# Patient Record
Sex: MALE | Race: WHITE | Employment: OTHER | URBAN - METROPOLITAN AREA
[De-identification: names, ages, dates, MRNs, and addresses within clinical notes are randomized per-mention and may not be internally consistent; named-entity substitution may affect disease eponyms.]

---

## 2017-01-06 ENCOUNTER — OFFICE VISIT (OUTPATIENT)
Dept: FAMILY MEDICINE CLINIC | Age: 37
End: 2017-01-06

## 2017-01-06 VITALS
WEIGHT: 315 LBS | HEART RATE: 84 BPM | DIASTOLIC BLOOD PRESSURE: 84 MMHG | SYSTOLIC BLOOD PRESSURE: 136 MMHG | BODY MASS INDEX: 44.25 KG/M2

## 2017-01-06 DIAGNOSIS — E29.1 PRIMARY HYPOGONADISM IN MALE: ICD-10-CM

## 2017-01-06 DIAGNOSIS — R41.3 MEMORY LOSS: ICD-10-CM

## 2017-01-06 DIAGNOSIS — I10 ESSENTIAL HYPERTENSION: Primary | ICD-10-CM

## 2017-01-06 PROCEDURE — 99214 OFFICE O/P EST MOD 30 MIN: CPT | Performed by: FAMILY MEDICINE

## 2017-01-06 RX ORDER — AMLODIPINE BESYLATE 10 MG/1
10 TABLET ORAL DAILY
Qty: 30 TABLET | Refills: 5 | Status: SHIPPED | OUTPATIENT
Start: 2017-01-06 | End: 2017-09-29 | Stop reason: SDUPTHER

## 2017-01-08 ASSESSMENT — ENCOUNTER SYMPTOMS
ABDOMINAL PAIN: 0
BACK PAIN: 1
COUGH: 0
WHEEZING: 0
SHORTNESS OF BREATH: 1

## 2017-01-18 ENCOUNTER — INITIAL CONSULT (OUTPATIENT)
Dept: NEUROLOGY | Age: 37
End: 2017-01-18

## 2017-01-18 VITALS
SYSTOLIC BLOOD PRESSURE: 128 MMHG | BODY MASS INDEX: 39.17 KG/M2 | WEIGHT: 315 LBS | HEIGHT: 75 IN | DIASTOLIC BLOOD PRESSURE: 83 MMHG | OXYGEN SATURATION: 96 % | HEART RATE: 78 BPM

## 2017-01-18 DIAGNOSIS — G47.33 OSA ON CPAP: ICD-10-CM

## 2017-01-18 DIAGNOSIS — R55 SYNCOPE AND COLLAPSE: Primary | ICD-10-CM

## 2017-01-18 DIAGNOSIS — F07.81 CTE (CHRONIC TRAUMATIC ENCEPHALOPATHY): ICD-10-CM

## 2017-01-18 DIAGNOSIS — Z99.89 OSA ON CPAP: ICD-10-CM

## 2017-01-18 DIAGNOSIS — R56.9 NEW ONSET SEIZURE (HCC): ICD-10-CM

## 2017-01-18 DIAGNOSIS — R41.3 MEMORY LOSS: ICD-10-CM

## 2017-01-18 PROCEDURE — 99214 OFFICE O/P EST MOD 30 MIN: CPT | Performed by: PSYCHIATRY & NEUROLOGY

## 2017-01-26 ENCOUNTER — HOSPITAL ENCOUNTER (OUTPATIENT)
Dept: NEUROLOGY | Age: 37
Discharge: HOME | End: 2017-01-26
Attending: PSYCHIATRY & NEUROLOGY | Admitting: PSYCHIATRY & NEUROLOGY

## 2017-01-26 DIAGNOSIS — R55 SYNCOPE AND COLLAPSE: ICD-10-CM

## 2017-01-26 PROCEDURE — 95816 EEG AWAKE AND DROWSY: CPT | Performed by: PSYCHIATRY & NEUROLOGY

## 2017-02-02 ENCOUNTER — TELEPHONE (OUTPATIENT)
Dept: NEUROLOGY | Age: 37
End: 2017-02-02

## 2017-02-07 ENCOUNTER — OFFICE VISIT (OUTPATIENT)
Dept: PULMONOLOGY | Age: 37
End: 2017-02-07

## 2017-02-07 ENCOUNTER — TELEPHONE (OUTPATIENT)
Dept: PULMONOLOGY | Age: 37
End: 2017-02-07

## 2017-02-07 VITALS
TEMPERATURE: 97.1 F | SYSTOLIC BLOOD PRESSURE: 152 MMHG | RESPIRATION RATE: 20 BRPM | BODY MASS INDEX: 39.17 KG/M2 | WEIGHT: 315 LBS | HEIGHT: 75 IN | OXYGEN SATURATION: 98 % | DIASTOLIC BLOOD PRESSURE: 88 MMHG | HEART RATE: 111 BPM

## 2017-02-07 DIAGNOSIS — E66.01 MORBID OBESITY, UNSPECIFIED OBESITY TYPE (HCC): ICD-10-CM

## 2017-02-07 DIAGNOSIS — J98.6 PARALYZED HEMIDIAPHRAGM: ICD-10-CM

## 2017-02-07 DIAGNOSIS — R06.00 DYSPNEA, UNSPECIFIED TYPE: ICD-10-CM

## 2017-02-07 DIAGNOSIS — J98.6 ELEVATED HEMIDIAPHRAGM: ICD-10-CM

## 2017-02-07 DIAGNOSIS — G47.33 SEVERE OBSTRUCTIVE SLEEP APNEA: ICD-10-CM

## 2017-02-07 DIAGNOSIS — R06.00 DYSPNEA, UNSPECIFIED TYPE: Primary | ICD-10-CM

## 2017-02-07 DIAGNOSIS — J98.6 ELEVATED HEMIDIAPHRAGM: Primary | ICD-10-CM

## 2017-02-07 PROCEDURE — 99214 OFFICE O/P EST MOD 30 MIN: CPT | Performed by: INTERNAL MEDICINE

## 2017-02-07 RX ORDER — ALBUTEROL SULFATE 90 UG/1
2 AEROSOL, METERED RESPIRATORY (INHALATION) EVERY 6 HOURS PRN
Qty: 1 INHALER | Refills: 6 | Status: SHIPPED | OUTPATIENT
Start: 2017-02-07 | End: 2018-01-23 | Stop reason: ALTCHOICE

## 2017-02-13 ENCOUNTER — HOSPITAL ENCOUNTER (OUTPATIENT)
Dept: OTHER | Age: 37
Discharge: HOME | End: 2017-02-13
Attending: INTERNAL MEDICINE | Admitting: INTERNAL MEDICINE

## 2017-02-13 VITALS
BODY MASS INDEX: 39.17 KG/M2 | DIASTOLIC BLOOD PRESSURE: 80 MMHG | SYSTOLIC BLOOD PRESSURE: 164 MMHG | HEIGHT: 75 IN | WEIGHT: 315 LBS | TEMPERATURE: 97.8 F | HEART RATE: 81 BPM | RESPIRATION RATE: 18 BRPM | OXYGEN SATURATION: 98 %

## 2017-02-13 DIAGNOSIS — R93.89 ABNORMAL CXR: ICD-10-CM

## 2017-02-13 PROCEDURE — 31624 DX BRONCHOSCOPE/LAVAGE: CPT | Performed by: INTERNAL MEDICINE

## 2017-02-15 LAB
CULTURE, RESPIRATORY: NORMAL
GRAM STAIN RESULT: NORMAL

## 2017-02-17 ENCOUNTER — TELEPHONE (OUTPATIENT)
Dept: BARIATRICS/WEIGHT MGMT | Age: 37
End: 2017-02-17

## 2017-02-17 ENCOUNTER — HOSPITAL ENCOUNTER (OUTPATIENT)
Dept: PULMONOLOGY | Age: 37
Discharge: HOME | End: 2017-02-17
Attending: INTERNAL MEDICINE | Admitting: INTERNAL MEDICINE

## 2017-02-17 ENCOUNTER — HOSPITAL ENCOUNTER (OUTPATIENT)
Dept: PULMONOLOGY | Age: 37
Discharge: HOME | End: 2017-02-13
Attending: INTERNAL MEDICINE | Admitting: INTERNAL MEDICINE

## 2017-02-17 DIAGNOSIS — R06.00 DYSPNEA: ICD-10-CM

## 2017-02-17 RX ORDER — ALBUTEROL SULFATE 2.5 MG/3ML
2.5 SOLUTION RESPIRATORY (INHALATION) ONCE
Status: COMPLETED | OUTPATIENT
Start: 2017-02-17 | End: 2017-02-17

## 2017-02-17 RX ADMIN — ALBUTEROL SULFATE 2.5 MG: 2.5 SOLUTION RESPIRATORY (INHALATION) at 09:56

## 2017-02-27 ENCOUNTER — OFFICE VISIT (OUTPATIENT)
Dept: PULMONOLOGY | Age: 37
End: 2017-02-27

## 2017-02-27 ENCOUNTER — TELEPHONE (OUTPATIENT)
Dept: PULMONOLOGY | Age: 37
End: 2017-02-27

## 2017-02-27 ENCOUNTER — HOSPITAL ENCOUNTER (OUTPATIENT)
Dept: OTHER | Age: 37
Discharge: HOME | End: 2017-02-27
Attending: INTERNAL MEDICINE | Admitting: INTERNAL MEDICINE

## 2017-02-27 VITALS
SYSTOLIC BLOOD PRESSURE: 140 MMHG | HEART RATE: 68 BPM | BODY MASS INDEX: 39.17 KG/M2 | OXYGEN SATURATION: 98 % | RESPIRATION RATE: 20 BRPM | TEMPERATURE: 97.8 F | DIASTOLIC BLOOD PRESSURE: 88 MMHG | WEIGHT: 315 LBS | HEIGHT: 75 IN

## 2017-02-27 DIAGNOSIS — R06.00 DYSPNEA, UNSPECIFIED TYPE: Primary | ICD-10-CM

## 2017-02-27 DIAGNOSIS — G47.33 SEVERE OBSTRUCTIVE SLEEP APNEA: ICD-10-CM

## 2017-02-27 DIAGNOSIS — R93.89 ABNORMAL CT SCAN, CHEST: ICD-10-CM

## 2017-02-27 DIAGNOSIS — E66.01 MORBID OBESITY, UNSPECIFIED OBESITY TYPE (HCC): ICD-10-CM

## 2017-02-27 LAB — D DIMER: <200 NG/ML DDU (ref 0–229)

## 2017-02-27 PROCEDURE — 99214 OFFICE O/P EST MOD 30 MIN: CPT | Performed by: INTERNAL MEDICINE

## 2017-02-27 RX ORDER — BUDESONIDE AND FORMOTEROL FUMARATE DIHYDRATE 160; 4.5 UG/1; UG/1
2 AEROSOL RESPIRATORY (INHALATION) 2 TIMES DAILY
Qty: 1 INHALER | Refills: 6 | Status: SHIPPED | OUTPATIENT
Start: 2017-02-27 | End: 2017-03-02 | Stop reason: CLARIF

## 2017-03-01 ENCOUNTER — TELEPHONE (OUTPATIENT)
Dept: BARIATRICS/WEIGHT MGMT | Age: 37
End: 2017-03-01

## 2017-03-02 ENCOUNTER — TELEPHONE (OUTPATIENT)
Dept: FAMILY MEDICINE CLINIC | Age: 37
End: 2017-03-02

## 2017-03-02 RX ORDER — FLUTICASONE FUROATE AND VILANTEROL 100; 25 UG/1; UG/1
1 POWDER RESPIRATORY (INHALATION) DAILY
Qty: 1 EACH | Refills: 5 | Status: SHIPPED | OUTPATIENT
Start: 2017-03-02 | End: 2017-07-03

## 2017-03-02 NOTE — TELEPHONE ENCOUNTER
Patient was advised we do not do these, we have no chain of custody for this type of test.  Can try an urgent care but ultimately this is between him and the probation office/court.

## 2017-03-18 ENCOUNTER — TELEPHONE (OUTPATIENT)
Dept: FAMILY MEDICINE CLINIC | Age: 37
End: 2017-03-18

## 2017-03-18 RX ORDER — OSELTAMIVIR PHOSPHATE 75 MG/1
75 CAPSULE ORAL DAILY
Qty: 10 CAPSULE | Refills: 0 | Status: SHIPPED | OUTPATIENT
Start: 2017-03-18 | End: 2017-03-28

## 2017-04-03 ENCOUNTER — OFFICE VISIT (OUTPATIENT)
Dept: PULMONOLOGY | Age: 37
End: 2017-04-03

## 2017-04-03 VITALS
OXYGEN SATURATION: 96 % | HEART RATE: 99 BPM | HEIGHT: 75 IN | SYSTOLIC BLOOD PRESSURE: 132 MMHG | DIASTOLIC BLOOD PRESSURE: 80 MMHG | RESPIRATION RATE: 16 BRPM | TEMPERATURE: 97.6 F | WEIGHT: 315 LBS | BODY MASS INDEX: 39.17 KG/M2

## 2017-04-03 DIAGNOSIS — E66.01 MORBID OBESITY, UNSPECIFIED OBESITY TYPE (HCC): ICD-10-CM

## 2017-04-03 DIAGNOSIS — G47.33 SEVERE OBSTRUCTIVE SLEEP APNEA: ICD-10-CM

## 2017-04-03 DIAGNOSIS — R06.00 DYSPNEA, UNSPECIFIED TYPE: Primary | ICD-10-CM

## 2017-04-03 PROCEDURE — 99214 OFFICE O/P EST MOD 30 MIN: CPT | Performed by: INTERNAL MEDICINE

## 2017-04-03 ASSESSMENT — SLEEP AND FATIGUE QUESTIONNAIRES
HOW LIKELY ARE YOU TO NOD OFF OR FALL ASLEEP WHILE WATCHING TV: 0
HOW LIKELY ARE YOU TO NOD OFF OR FALL ASLEEP WHILE SITTING AND READING: 0
HOW LIKELY ARE YOU TO NOD OFF OR FALL ASLEEP WHILE SITTING AND TALKING TO SOMEONE: 0
HOW LIKELY ARE YOU TO NOD OFF OR FALL ASLEEP WHILE SITTING INACTIVE IN A PUBLIC PLACE: 0
NECK CIRCUMFERENCE (INCHES): 20
HOW LIKELY ARE YOU TO NOD OFF OR FALL ASLEEP IN A CAR, WHILE STOPPED FOR A FEW MINUTES IN TRAFFIC: 0
ESS TOTAL SCORE: 0
HOW LIKELY ARE YOU TO NOD OFF OR FALL ASLEEP WHILE LYING DOWN TO REST IN THE AFTERNOON WHEN CIRCUMSTANCES PERMIT: 0
HOW LIKELY ARE YOU TO NOD OFF OR FALL ASLEEP WHILE SITTING QUIETLY AFTER LUNCH WITHOUT ALCOHOL: 0
HOW LIKELY ARE YOU TO NOD OFF OR FALL ASLEEP WHEN YOU ARE A PASSENGER IN A CAR FOR AN HOUR WITHOUT A BREAK: 0

## 2017-04-24 ENCOUNTER — HOSPITAL ENCOUNTER (OUTPATIENT)
Dept: SLEEP MEDICINE | Age: 37
Discharge: HOME | End: 2017-04-26
Attending: INTERNAL MEDICINE | Admitting: INTERNAL MEDICINE

## 2017-04-24 DIAGNOSIS — G47.33 SEVERE OBSTRUCTIVE SLEEP APNEA: ICD-10-CM

## 2017-04-27 DIAGNOSIS — G47.33 OSA ON CPAP: ICD-10-CM

## 2017-04-27 DIAGNOSIS — G47.33 OSA (OBSTRUCTIVE SLEEP APNEA): Primary | ICD-10-CM

## 2017-04-27 DIAGNOSIS — E66.01 OBESITY, CLASS III, BMI 40-49.9 (MORBID OBESITY) (HCC): ICD-10-CM

## 2017-04-27 DIAGNOSIS — Z99.89 OSA ON CPAP: ICD-10-CM

## 2017-06-05 ENCOUNTER — OFFICE VISIT (OUTPATIENT)
Dept: CARDIOLOGY CLINIC | Age: 37
End: 2017-06-05

## 2017-06-05 VITALS
BODY MASS INDEX: 39.17 KG/M2 | HEIGHT: 75 IN | HEART RATE: 104 BPM | DIASTOLIC BLOOD PRESSURE: 90 MMHG | OXYGEN SATURATION: 97 % | WEIGHT: 315 LBS | SYSTOLIC BLOOD PRESSURE: 142 MMHG

## 2017-06-05 DIAGNOSIS — E66.9 OBESITY, UNSPECIFIED OBESITY SEVERITY, UNSPECIFIED OBESITY TYPE: ICD-10-CM

## 2017-06-05 DIAGNOSIS — R06.02 SOB (SHORTNESS OF BREATH): Primary | ICD-10-CM

## 2017-06-05 DIAGNOSIS — I10 ESSENTIAL HYPERTENSION: ICD-10-CM

## 2017-06-05 PROCEDURE — 99215 OFFICE O/P EST HI 40 MIN: CPT | Performed by: INTERNAL MEDICINE

## 2017-06-05 PROCEDURE — 93000 ELECTROCARDIOGRAM COMPLETE: CPT | Performed by: INTERNAL MEDICINE

## 2017-07-03 ENCOUNTER — OFFICE VISIT (OUTPATIENT)
Dept: PULMONOLOGY | Age: 37
End: 2017-07-03

## 2017-07-03 VITALS
OXYGEN SATURATION: 95 % | BODY MASS INDEX: 39.17 KG/M2 | HEIGHT: 75 IN | WEIGHT: 315 LBS | HEART RATE: 100 BPM | RESPIRATION RATE: 20 BRPM | SYSTOLIC BLOOD PRESSURE: 144 MMHG | TEMPERATURE: 97.9 F | DIASTOLIC BLOOD PRESSURE: 98 MMHG

## 2017-07-03 DIAGNOSIS — R06.00 DYSPNEA, UNSPECIFIED TYPE: ICD-10-CM

## 2017-07-03 DIAGNOSIS — G47.33 SEVERE OBSTRUCTIVE SLEEP APNEA: Primary | ICD-10-CM

## 2017-07-03 PROCEDURE — 99214 OFFICE O/P EST MOD 30 MIN: CPT | Performed by: INTERNAL MEDICINE

## 2017-07-03 ASSESSMENT — SLEEP AND FATIGUE QUESTIONNAIRES
HOW LIKELY ARE YOU TO NOD OFF OR FALL ASLEEP WHEN YOU ARE A PASSENGER IN A CAR FOR AN HOUR WITHOUT A BREAK: 0
HOW LIKELY ARE YOU TO NOD OFF OR FALL ASLEEP WHILE LYING DOWN TO REST IN THE AFTERNOON WHEN CIRCUMSTANCES PERMIT: 0
ESS TOTAL SCORE: 0
NECK CIRCUMFERENCE (INCHES): 20
HOW LIKELY ARE YOU TO NOD OFF OR FALL ASLEEP WHILE SITTING INACTIVE IN A PUBLIC PLACE: 0
HOW LIKELY ARE YOU TO NOD OFF OR FALL ASLEEP WHILE SITTING QUIETLY AFTER LUNCH WITHOUT ALCOHOL: 0
HOW LIKELY ARE YOU TO NOD OFF OR FALL ASLEEP WHILE SITTING AND TALKING TO SOMEONE: 0
HOW LIKELY ARE YOU TO NOD OFF OR FALL ASLEEP IN A CAR, WHILE STOPPED FOR A FEW MINUTES IN TRAFFIC: 0
HOW LIKELY ARE YOU TO NOD OFF OR FALL ASLEEP WHILE WATCHING TV: 0
HOW LIKELY ARE YOU TO NOD OFF OR FALL ASLEEP WHILE SITTING AND READING: 0

## 2017-07-18 ENCOUNTER — HOSPITAL ENCOUNTER (OUTPATIENT)
Dept: NON INVASIVE DIAGNOSTICS | Age: 37
Discharge: HOME | End: 2017-07-18
Attending: INTERNAL MEDICINE | Admitting: INTERNAL MEDICINE

## 2017-07-18 DIAGNOSIS — I10 ESSENTIAL (PRIMARY) HYPERTENSION: ICD-10-CM

## 2017-07-18 LAB
LV EF: 55 %
LVEF MODALITY: NORMAL
PRO-BNP: 7 PG/ML (ref 0–124)

## 2017-07-20 ENCOUNTER — TELEPHONE (OUTPATIENT)
Dept: CARDIOLOGY CLINIC | Age: 37
End: 2017-07-20

## 2017-07-24 ENCOUNTER — TELEPHONE (OUTPATIENT)
Dept: CARDIOLOGY CLINIC | Age: 37
End: 2017-07-24

## 2017-07-24 DIAGNOSIS — R06.02 SOB (SHORTNESS OF BREATH): Primary | ICD-10-CM

## 2017-07-27 ENCOUNTER — TELEPHONE (OUTPATIENT)
Dept: CARDIOLOGY CLINIC | Age: 37
End: 2017-07-27

## 2017-08-11 ENCOUNTER — TELEPHONE (OUTPATIENT)
Dept: FAMILY MEDICINE CLINIC | Age: 37
End: 2017-08-11

## 2017-08-11 ENCOUNTER — TELEPHONE (OUTPATIENT)
Dept: CARDIOLOGY CLINIC | Age: 37
End: 2017-08-11

## 2017-09-11 ENCOUNTER — OFFICE VISIT (OUTPATIENT)
Dept: CARDIOLOGY CLINIC | Age: 37
End: 2017-09-11

## 2017-09-11 VITALS
BODY MASS INDEX: 39.17 KG/M2 | WEIGHT: 315 LBS | SYSTOLIC BLOOD PRESSURE: 134 MMHG | HEIGHT: 75 IN | HEART RATE: 78 BPM | DIASTOLIC BLOOD PRESSURE: 88 MMHG

## 2017-09-11 DIAGNOSIS — I27.20 PULMONARY HTN (HCC): ICD-10-CM

## 2017-09-11 DIAGNOSIS — E66.01 OBESITY, CLASS III, BMI 40-49.9 (MORBID OBESITY) (HCC): Primary | ICD-10-CM

## 2017-09-11 DIAGNOSIS — I51.7 MILD CONCENTRIC LEFT VENTRICULAR HYPERTROPHY (LVH): ICD-10-CM

## 2017-09-11 PROCEDURE — 99214 OFFICE O/P EST MOD 30 MIN: CPT | Performed by: INTERNAL MEDICINE

## 2017-09-29 DIAGNOSIS — I10 ESSENTIAL HYPERTENSION: ICD-10-CM

## 2017-09-29 RX ORDER — AMLODIPINE BESYLATE 10 MG/1
10 TABLET ORAL DAILY
Qty: 30 TABLET | Refills: 2 | Status: SHIPPED | OUTPATIENT
Start: 2017-09-29 | End: 2018-01-08 | Stop reason: SDUPTHER

## 2017-09-29 NOTE — TELEPHONE ENCOUNTER
Done/noted. Nurse - Please advise patient he's due for followup fasting office visit within the next 3 months.

## 2017-10-03 ENCOUNTER — OFFICE VISIT (OUTPATIENT)
Dept: NEUROLOGY | Age: 37
End: 2017-10-03

## 2017-10-03 VITALS
OXYGEN SATURATION: 96 % | HEIGHT: 75 IN | SYSTOLIC BLOOD PRESSURE: 146 MMHG | WEIGHT: 315 LBS | DIASTOLIC BLOOD PRESSURE: 101 MMHG | HEART RATE: 92 BPM | BODY MASS INDEX: 39.17 KG/M2

## 2017-10-03 DIAGNOSIS — G43.119 INTRACTABLE MIGRAINE WITH AURA WITHOUT STATUS MIGRAINOSUS: ICD-10-CM

## 2017-10-03 DIAGNOSIS — Z99.89 OSA ON CPAP: ICD-10-CM

## 2017-10-03 DIAGNOSIS — G44.52 NEW PERSISTENT DAILY HEADACHE: Primary | ICD-10-CM

## 2017-10-03 DIAGNOSIS — F07.81 CTE (CHRONIC TRAUMATIC ENCEPHALOPATHY): ICD-10-CM

## 2017-10-03 DIAGNOSIS — G47.33 OSA ON CPAP: ICD-10-CM

## 2017-10-03 PROCEDURE — 99214 OFFICE O/P EST MOD 30 MIN: CPT | Performed by: PSYCHIATRY & NEUROLOGY

## 2017-10-03 RX ORDER — TOPIRAMATE 25 MG/1
50 TABLET ORAL 2 TIMES DAILY
Qty: 120 TABLET | Refills: 3 | Status: SHIPPED | OUTPATIENT
Start: 2017-10-03 | End: 2018-02-20 | Stop reason: SDUPTHER

## 2017-10-03 ASSESSMENT — ENCOUNTER SYMPTOMS
BACK PAIN: 0
VOMITING: 0
PHOTOPHOBIA: 1
ABDOMINAL PAIN: 0
DOUBLE VISION: 0
NAUSEA: 1
COUGH: 0
HEMOPTYSIS: 0

## 2017-10-03 NOTE — PROGRESS NOTES
The patient came today for follow up regarding: memory loss and chronic headaches. The patient was last seen in 1-17. Since that time, he had MRI brain which showed no acute lesions. He denies any more LOC or TROY. He has occasional and episodic short-term memory loss three times a week. He can be forgetful at times and needs prompt from his wife. He continues to be independent in his ADL. He is using his CPAP machine every night. Denies any excessive EDS or frequent arousals at night. Patient is complaining of frequent headaches. Frequency could be few times a week. Location is in the right frontal and temporal and description can be dull achy and pulsating. Triggers: Including noise, stress or light. He has some nausea but no vomiting. Occasional blurred vision. No visual aura. Duration is minutes to hours and degree can be severe. He has been using Excedrin Migraine daily for quite some time. No recent trauma or injury or fever or chills. Other review of system was unremarkable. Past Medical History:   Diagnosis Date    Chicken pox 3/84    Closed fracture of arm age 13    Concussion     Hx of concussions    Concussion     Decorative tattoo     Fall     GERD (gastroesophageal reflux disease)     Hormone replacement therapy     Hypertension     Obese     XAVIER on CPAP     Pericarditis     Pneumonia     Prolonged emergence from general anesthesia     agitation    Umbilical hernia 75/22/3668     Prior to Visit Medications    Medication Sig Taking?  Authorizing Provider   amLODIPine (NORVASC) 10 MG tablet Take 1 tablet by mouth daily (instead of the 5mg dose.) Yes Js Hoskins MD   albuterol sulfate HFA (VENTOLIN HFA) 108 (90 BASE) MCG/ACT inhaler Inhale 2 puffs into the lungs every 6 hours as needed for Wheezing or Shortness of Breath Yes Everardo Rivas MD   TESTOSTERONE IM Inject into the muscle Yes Historical Provider, MD   NONFORMULARY secretropin spray Yes Historical

## 2017-10-03 NOTE — PATIENT INSTRUCTIONS
TOPAMAX 25 mg tablet    Week one 0 tabs a.m. 1 tab p.m. Week two 1 tab a.m. 1 tab p.m. Week three 1 tab a.m. 2 tabs p.m. Week four 2 tabs a.m. 2 tabs p.m.

## 2017-10-03 NOTE — MR AVS SNAPSHOT
cancers. BMI is not perfect. It may overestimate body fat in athletes and people who are more muscular. Even a small weight loss (between 5 and 10 percent of your current weight) by decreasing your calorie intake and becoming more physically active will help lower your risk of developing or worsening diseases associated with obesity. Learn more at: [employer]          Instructions    TOPAMAX 25 mg tablet    Week one 0 tabs a.m. 1 tab p.m. Week two 1 tab a.m. 1 tab p.m. Week three 1 tab a.m. 2 tabs p.m. Week four 2 tabs a.m. 2 tabs p.m. Today's Medication Changes          These changes are accurate as of: 10/3/17 12:57 PM.  If you have any questions, ask your nurse or doctor. START taking these medications           topiramate 25 MG tablet   Commonly known as:  TOPAMAX   Instructions: Take 2 tablets by mouth 2 times daily   Quantity:  120 tablet   Refills:  3   Started by:   Jayleen Casillas MD            Where to Get Your Medications      These medications were sent to [address] [place] [address],[facility], [address] [place] [address], [address] [id number]     Phone:  [phone]     topiramate 25 MG tablet               Your Current Medications Are              topiramate (TOPAMAX) 25 MG tablet Take 2 tablets by mouth 2 times daily    amLODIPine (NORVASC) 10 MG tablet Take 1 tablet by mouth daily (instead of the 5mg dose.)    albuterol sulfate HFA (VENTOLIN HFA) 108 (90 BASE) MCG/ACT inhaler Inhale 2 puffs into the lungs every 6 hours as needed for Wheezing or Shortness of Breath    TESTOSTERONE IM Inject into the muscle    NONFORMULARY secretropin spray    NONFORMULARY Cognitive aminos    ketoprofen (ORUDIS) 50 MG capsule Take 1 capsule by mouth 3 times daily as needed for Pain    DHEA 10 MG TABS Take 10 mg/day by mouth    Pregnenolone POWD 30 capsules by Does not apply route Daily

## 2017-10-11 ENCOUNTER — OFFICE VISIT (OUTPATIENT)
Dept: PULMONOLOGY | Age: 37
End: 2017-10-11

## 2017-10-11 VITALS
DIASTOLIC BLOOD PRESSURE: 92 MMHG | BODY MASS INDEX: 39.17 KG/M2 | HEART RATE: 84 BPM | TEMPERATURE: 97.9 F | RESPIRATION RATE: 16 BRPM | HEIGHT: 75 IN | OXYGEN SATURATION: 98 % | WEIGHT: 315 LBS | SYSTOLIC BLOOD PRESSURE: 156 MMHG

## 2017-10-11 DIAGNOSIS — R06.00 DYSPNEA, UNSPECIFIED TYPE: ICD-10-CM

## 2017-10-11 DIAGNOSIS — J98.6 DIAPHRAGM PARALYSIS: ICD-10-CM

## 2017-10-11 DIAGNOSIS — G47.33 SEVERE OBSTRUCTIVE SLEEP APNEA: Primary | ICD-10-CM

## 2017-10-11 PROCEDURE — 99214 OFFICE O/P EST MOD 30 MIN: CPT | Performed by: INTERNAL MEDICINE

## 2017-10-11 RX ORDER — BUDESONIDE AND FORMOTEROL FUMARATE DIHYDRATE 160; 4.5 UG/1; UG/1
2 AEROSOL RESPIRATORY (INHALATION) 2 TIMES DAILY
Qty: 1 INHALER | Refills: 0 | COMMUNITY
Start: 2017-10-11 | End: 2018-01-23 | Stop reason: ALTCHOICE

## 2017-10-11 ASSESSMENT — SLEEP AND FATIGUE QUESTIONNAIRES
HOW LIKELY ARE YOU TO NOD OFF OR FALL ASLEEP WHILE LYING DOWN TO REST IN THE AFTERNOON WHEN CIRCUMSTANCES PERMIT: 0
HOW LIKELY ARE YOU TO NOD OFF OR FALL ASLEEP WHILE WATCHING TV: 0
NECK CIRCUMFERENCE (INCHES): 21.25
ESS TOTAL SCORE: 0
HOW LIKELY ARE YOU TO NOD OFF OR FALL ASLEEP WHILE SITTING QUIETLY AFTER LUNCH WITHOUT ALCOHOL: 0
HOW LIKELY ARE YOU TO NOD OFF OR FALL ASLEEP WHILE SITTING AND TALKING TO SOMEONE: 0
HOW LIKELY ARE YOU TO NOD OFF OR FALL ASLEEP WHILE SITTING INACTIVE IN A PUBLIC PLACE: 0
HOW LIKELY ARE YOU TO NOD OFF OR FALL ASLEEP WHILE SITTING AND READING: 0
HOW LIKELY ARE YOU TO NOD OFF OR FALL ASLEEP WHEN YOU ARE A PASSENGER IN A CAR FOR AN HOUR WITHOUT A BREAK: 0
HOW LIKELY ARE YOU TO NOD OFF OR FALL ASLEEP IN A CAR, WHILE STOPPED FOR A FEW MINUTES IN TRAFFIC: 0

## 2017-10-11 NOTE — PATIENT INSTRUCTIONS
Sleep Hygiene. .. Tips for better sleep. .. Avoid naps. This will ensure you are sleepy at bedtime. If you have to take a nap, sleep less than 1 hour, before 3 pm.  Sleep only when sleepy; this reduces the time you are awake in bed. Regular exercise is recommended to help you deepen your sleep, but not within 4-6 hours of your bedtime. Timing of exercise is important, aim to exercise early in the morning or early afternoon. A light snack may help you fall asleep. Warm milk and foods high in the amino acid tryptophan, such as bananas, may help you to sleep  Be sure to avoid heavy, spicy or sugary foods 4-6 hours before bedtime and avoid at snack time. Stay away from stimulants such as caffeine and nicotine for at least 4-6 hours before bed. Stimulants can interfere with your ability to fall asleep. Caffeine is found in tea, cola, coffee, cocoa and chocolate and is best avoided at bedtime. Nicotine is found in tobacco products. Avoid alcohol 4-6 hours before bedtime. Alcohol has an immediate sleep-inducing effect, after a few hours when alcohol levels fall there is a stimulant or wake-up effect and will cause fragmented sleep. Sleep rituals are important. Give your body clues it is time to slow down and sleep. Examples include; yoga, deep breathing, listen to relaxing music, a hot bath or a few minutes of reading. Have a fixed bedtime and awakening time, Even on weekends! You will feel better keeping a regular sleep cycle, even if you are retired or not working. Get into your favorite sleep position. If not asleep in 30 minutes, get up and do something boring until you feel sleepy. Remember not to expose yourself to bright lights such as TV, phone or tablet screens. Only use your bed for sleeping. Do not use your bed as an office, workroom or recreation room. Use comfortable bedding. Uncomfortable bedding can prevent good sleep. Ensure your bedroom is quiet and comfortable.  A cooler room along with

## 2017-10-11 NOTE — PROGRESS NOTES
Pt can not complete CPET due to Weight restictions. Pt must weigh less then 350lbs.  Dr. Marybeth Silva notified
identified  No acute cardiopulmonary disease. Assessment:       · Severe XAVIER. BiPAP 18/13 cmH2O. Full face mask. · Dyspnea since 2014. Unclear etiology. Normal TSH and Hb. Negative D dimer. Normal MIP and MEP. R hemidiaphragm, reactive airway disease, obesity, pulm HTN, deconditioning and anxiety are contributing. Not able to CPET due to weight. 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 Negative bronch for EBL 2/13/2017   · Abnormal CT chest 2/22/2017 with RLL patchy ASD- likely due to BAL done on Bronch. Resolved on repeat CT chest    · Morbid obesity         Plan:       · Albuterol 2 puffs Q4-6 hrs PRN  · Trial of Symbicort 160/4.5 2 puffs BID   · PFTs and 6MW   · CPET   · Advised to use BiPAP 6-8 hrs at night and during naps. · Replacement of mask, tubing, head straps every 3-6 months or sooner if damaged. · Follow up BIPAP compliance and pressure adjustment if needed  · Sleep hygiene  · Avoid sedatives, alcohol and caffeinated drinks at bed time. · No driving motorized vehicles or operating heavy machinery while fatigue, drowsy or sleepy. · Weight loss is also recommended as a long-term intervention. · Complications of XAVIER if not treated were discussed with patient patient to include systemic hypertension, pulmonary hypertension, cardiovascular morbidities, car accidents and all cause mortality.

## 2018-01-08 DIAGNOSIS — I10 ESSENTIAL HYPERTENSION: ICD-10-CM

## 2018-01-09 RX ORDER — AMLODIPINE BESYLATE 10 MG/1
TABLET ORAL
Qty: 30 TABLET | Refills: 0 | Status: SHIPPED | OUTPATIENT
Start: 2018-01-09 | End: 2018-01-23 | Stop reason: SDUPTHER

## 2018-01-18 ENCOUNTER — TELEPHONE (OUTPATIENT)
Dept: PULMONOLOGY | Age: 38
End: 2018-01-18

## 2018-01-18 NOTE — TELEPHONE ENCOUNTER
Patient did not show for 3 month fua with  on 1/18/18      LOV   Assessment: 10/11/17      · Severe XAVIER. BiPAP 18/13 cmH2O. Full face mask. · Dyspnea since 2014. Unclear etiology. Normal TSH and Hb. Negative D dimer. Normal MIP and MEP. R hemidiaphragm, reactive airway disease, obesity, pulm HTN, deconditioning and anxiety are contributing. Not able to CPET due to weight. 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 Negative bronch for EBL 2/13/2017   · Abnormal CT chest 2/22/2017 with RLL patchy ASD- likely due to BAL done on Bronch. Resolved on repeat CT chest    · Morbid obesity          Plan:       · Albuterol 2 puffs Q4-6 hrs PRN  · Trial of Symbicort 160/4.5 2 puffs BID   · PFTs and 6MW   · CPET   · Advised to use BiPAP 6-8 hrs at night and during naps. · Replacement of mask, tubing, head straps every 3-6 months or sooner if damaged. · Follow up BIPAP compliance and pressure adjustment if needed  · Sleep hygiene  · Avoid sedatives, alcohol and caffeinated drinks at bed time. · No driving motorized vehicles or operating heavy machinery while fatigue, drowsy or sleepy. · Weight loss is also recommended as a long-term intervention.     · Complications of XAVIER if not treated were discussed with patient patient to include systemic hypertension, pulmonary hypertension, cardiovascular morbidities, car accidents and all cause mortality.

## 2018-01-23 ENCOUNTER — OFFICE VISIT (OUTPATIENT)
Dept: FAMILY MEDICINE CLINIC | Age: 38
End: 2018-01-23

## 2018-01-23 VITALS
WEIGHT: 315 LBS | SYSTOLIC BLOOD PRESSURE: 126 MMHG | HEART RATE: 76 BPM | BODY MASS INDEX: 45.5 KG/M2 | DIASTOLIC BLOOD PRESSURE: 80 MMHG

## 2018-01-23 DIAGNOSIS — Z99.89 OSA ON CPAP: ICD-10-CM

## 2018-01-23 DIAGNOSIS — I10 ESSENTIAL HYPERTENSION: ICD-10-CM

## 2018-01-23 DIAGNOSIS — G47.33 OSA ON CPAP: ICD-10-CM

## 2018-01-23 DIAGNOSIS — F07.81 CTE (CHRONIC TRAUMATIC ENCEPHALOPATHY): ICD-10-CM

## 2018-01-23 DIAGNOSIS — R41.3 MEMORY LOSS: Primary | ICD-10-CM

## 2018-01-23 PROCEDURE — 99213 OFFICE O/P EST LOW 20 MIN: CPT | Performed by: FAMILY MEDICINE

## 2018-01-23 PROCEDURE — 1036F TOBACCO NON-USER: CPT | Performed by: FAMILY MEDICINE

## 2018-01-23 PROCEDURE — G8417 CALC BMI ABV UP PARAM F/U: HCPCS | Performed by: FAMILY MEDICINE

## 2018-01-23 PROCEDURE — G8484 FLU IMMUNIZE NO ADMIN: HCPCS | Performed by: FAMILY MEDICINE

## 2018-01-23 PROCEDURE — G8427 DOCREV CUR MEDS BY ELIG CLIN: HCPCS | Performed by: FAMILY MEDICINE

## 2018-01-23 RX ORDER — AMLODIPINE BESYLATE 10 MG/1
TABLET ORAL
Qty: 90 TABLET | Refills: 1 | Status: SHIPPED | OUTPATIENT
Start: 2018-01-23 | End: 2018-01-23 | Stop reason: SDUPTHER

## 2018-01-23 RX ORDER — AMLODIPINE BESYLATE 10 MG/1
TABLET ORAL
Qty: 90 TABLET | Refills: 3 | Status: SHIPPED | OUTPATIENT
Start: 2018-01-23 | End: 2018-09-10 | Stop reason: SDUPTHER

## 2018-01-24 ASSESSMENT — ENCOUNTER SYMPTOMS
SHORTNESS OF BREATH: 0
ABDOMINAL PAIN: 0

## 2018-02-20 ENCOUNTER — OFFICE VISIT (OUTPATIENT)
Dept: NEUROLOGY | Age: 38
End: 2018-02-20

## 2018-02-20 VITALS
OXYGEN SATURATION: 97 % | SYSTOLIC BLOOD PRESSURE: 128 MMHG | DIASTOLIC BLOOD PRESSURE: 81 MMHG | HEIGHT: 75 IN | BODY MASS INDEX: 39.17 KG/M2 | HEART RATE: 67 BPM | WEIGHT: 315 LBS

## 2018-02-20 DIAGNOSIS — G47.33 OSA ON CPAP: ICD-10-CM

## 2018-02-20 DIAGNOSIS — G43.119 INTRACTABLE MIGRAINE WITH AURA WITHOUT STATUS MIGRAINOSUS: Primary | ICD-10-CM

## 2018-02-20 DIAGNOSIS — Z99.89 OSA ON CPAP: ICD-10-CM

## 2018-02-20 DIAGNOSIS — F07.81 CTE (CHRONIC TRAUMATIC ENCEPHALOPATHY): ICD-10-CM

## 2018-02-20 DIAGNOSIS — G44.52 NEW PERSISTENT DAILY HEADACHE: ICD-10-CM

## 2018-02-20 DIAGNOSIS — R41.3 MEMORY LOSS: ICD-10-CM

## 2018-02-20 PROCEDURE — G8417 CALC BMI ABV UP PARAM F/U: HCPCS | Performed by: PSYCHIATRY & NEUROLOGY

## 2018-02-20 PROCEDURE — 99214 OFFICE O/P EST MOD 30 MIN: CPT | Performed by: PSYCHIATRY & NEUROLOGY

## 2018-02-20 PROCEDURE — 1036F TOBACCO NON-USER: CPT | Performed by: PSYCHIATRY & NEUROLOGY

## 2018-02-20 PROCEDURE — G8427 DOCREV CUR MEDS BY ELIG CLIN: HCPCS | Performed by: PSYCHIATRY & NEUROLOGY

## 2018-02-20 PROCEDURE — G8484 FLU IMMUNIZE NO ADMIN: HCPCS | Performed by: PSYCHIATRY & NEUROLOGY

## 2018-02-20 RX ORDER — TOPIRAMATE 25 MG/1
50 TABLET ORAL 2 TIMES DAILY
Qty: 120 TABLET | Refills: 6 | Status: SHIPPED | OUTPATIENT
Start: 2018-02-20 | End: 2019-01-27 | Stop reason: ALTCHOICE

## 2018-02-20 ASSESSMENT — ENCOUNTER SYMPTOMS
GASTROINTESTINAL NEGATIVE: 1
HEARTBURN: 0
RESPIRATORY NEGATIVE: 1
BLURRED VISION: 1

## 2018-02-20 NOTE — PROGRESS NOTES
TABLET BY MOUTH DAILY Yes Js Hoskins MD   topiramate (TOPAMAX) 25 MG tablet Take 2 tablets by mouth 2 times daily Yes Marcelino Andres MD   Multiple Vitamin (MULTIVITAMIN PO) Take  by mouth. Yes Historical Provider, MD     Allergies   Allergen Reactions    Prednisone Hives     itchy    Rocephin [Ceftriaxone] Itching     Social History   Substance Use Topics    Smoking status: Former Smoker     Types: Cigars     Quit date: 5/2/2016    Smokeless tobacco: Never Used    Alcohol use 0.0 oz/week      Comment: socially-rarely     Family History   Problem Relation Age of Onset    Other Father      kidney stones    Cancer Father      bladder cancer, ? second hand smoke    Other Mother      bipolar disorder    Bipolar Disorder Mother     Depression Paternal Grandmother     Bipolar Disorder Other     Heart Disease Maternal Aunt     Cancer Maternal Grandfather      Colon     Past Surgical History:   Procedure Laterality Date    BRONCHOSCOPY  02/13/2016    CHOLECYSTECTOMY  2007 (?)    HERNIA REPAIR  10/14/2016               Umbilical Hernia Repair    HERNIA REPAIR  71/31/7317    umbilical    TONSILLECTOMY AND ADENOIDECTOMY      TYMPANOSTOMY TUBE PLACEMENT      bilaterally x 1    WISDOM TOOTH EXTRACTION      all 4         Exam:   Constitutional:   Vitals:    02/20/18 0953   BP: 128/81   Pulse: 67   SpO2: 97%   Weight: (!) 365 lb (165.6 kg)   Height: 6' 3\" (1.905 m)       General appearance: well-nourished. Eye: No icterus. No blurring of optic disc. Neck: supple  Cardiovascular: No carotid bruit. Heart: S1, S2         No lower leg edema with good pulsation. Mental Status: Oriented to person, place, problem, and time. Fluent speech. Good fund of knowledge. Normal attention span and concentration. Cranial Nerves:   II: Visual fields: Full to confrontation  III: Pupils: equal, round, reactive to light  III,IV,VI: Extra Ocular Movements are intact.  No nystagmus  V: Facial sensation is

## 2018-02-23 ENCOUNTER — OFFICE VISIT (OUTPATIENT)
Dept: FAMILY MEDICINE CLINIC | Age: 38
End: 2018-02-23

## 2018-02-23 VITALS
BODY MASS INDEX: 45.37 KG/M2 | SYSTOLIC BLOOD PRESSURE: 150 MMHG | WEIGHT: 315 LBS | HEART RATE: 84 BPM | DIASTOLIC BLOOD PRESSURE: 80 MMHG

## 2018-02-23 DIAGNOSIS — R53.83 FATIGUE, UNSPECIFIED TYPE: ICD-10-CM

## 2018-02-23 DIAGNOSIS — R51.9 FREQUENT HEADACHES: ICD-10-CM

## 2018-02-23 DIAGNOSIS — Z30.09 VASECTOMY EVALUATION: ICD-10-CM

## 2018-02-23 DIAGNOSIS — R79.89 LOW TESTOSTERONE IN MALE: Primary | ICD-10-CM

## 2018-02-23 DIAGNOSIS — R41.3 MEMORY LOSS: ICD-10-CM

## 2018-02-23 PROCEDURE — G8427 DOCREV CUR MEDS BY ELIG CLIN: HCPCS | Performed by: FAMILY MEDICINE

## 2018-02-23 PROCEDURE — G8484 FLU IMMUNIZE NO ADMIN: HCPCS | Performed by: FAMILY MEDICINE

## 2018-02-23 PROCEDURE — G8417 CALC BMI ABV UP PARAM F/U: HCPCS | Performed by: FAMILY MEDICINE

## 2018-02-23 PROCEDURE — 99213 OFFICE O/P EST LOW 20 MIN: CPT | Performed by: FAMILY MEDICINE

## 2018-02-23 PROCEDURE — 1036F TOBACCO NON-USER: CPT | Performed by: FAMILY MEDICINE

## 2018-02-25 ASSESSMENT — ENCOUNTER SYMPTOMS
COUGH: 0
ABDOMINAL PAIN: 0

## 2018-03-01 ENCOUNTER — OFFICE VISIT (OUTPATIENT)
Dept: ENDOCRINOLOGY | Age: 38
End: 2018-03-01

## 2018-03-01 VITALS
HEART RATE: 74 BPM | DIASTOLIC BLOOD PRESSURE: 72 MMHG | BODY MASS INDEX: 39.17 KG/M2 | HEIGHT: 75 IN | SYSTOLIC BLOOD PRESSURE: 120 MMHG | OXYGEN SATURATION: 99 % | WEIGHT: 315 LBS

## 2018-03-01 DIAGNOSIS — R53.83 OTHER FATIGUE: ICD-10-CM

## 2018-03-01 DIAGNOSIS — E29.1 HYPOGONADISM MALE: Primary | ICD-10-CM

## 2018-03-01 PROCEDURE — G8427 DOCREV CUR MEDS BY ELIG CLIN: HCPCS | Performed by: INTERNAL MEDICINE

## 2018-03-01 PROCEDURE — G8417 CALC BMI ABV UP PARAM F/U: HCPCS | Performed by: INTERNAL MEDICINE

## 2018-03-01 PROCEDURE — G8484 FLU IMMUNIZE NO ADMIN: HCPCS | Performed by: INTERNAL MEDICINE

## 2018-03-01 PROCEDURE — 99244 OFF/OP CNSLTJ NEW/EST MOD 40: CPT | Performed by: INTERNAL MEDICINE

## 2018-03-01 ASSESSMENT — PATIENT HEALTH QUESTIONNAIRE - PHQ9
SUM OF ALL RESPONSES TO PHQ QUESTIONS 1-9: 0
2. FEELING DOWN, DEPRESSED OR HOPELESS: 0
SUM OF ALL RESPONSES TO PHQ9 QUESTIONS 1 & 2: 0
1. LITTLE INTEREST OR PLEASURE IN DOING THINGS: 0

## 2018-03-01 NOTE — PATIENT INSTRUCTIONS
Patient Education        Fatigue: Care Instructions  Your Care Instructions    Fatigue is a feeling of tiredness, exhaustion, or lack of energy. You may feel fatigue because of too much or not enough activity. It can also come from stress, lack of sleep, boredom, and poor diet. Many medical problems, such as viral infections, can cause fatigue. Emotional problems, especially depression, are often the cause of fatigue. Fatigue is most often a symptom of another problem. Treatment for fatigue depends on the cause. For example, if you have fatigue because you have a certain health problem, treating this problem also treats your fatigue. If depression or anxiety is the cause, treatment may help. Follow-up care is a key part of your treatment and safety. Be sure to make and go to all appointments, and call your doctor if you are having problems. It's also a good idea to know your test results and keep a list of the medicines you take. How can you care for yourself at home? · Get regular exercise. But don't overdo it. Go back and forth between rest and exercise. · Get plenty of rest.  · Eat a healthy diet. Do not skip meals, especially breakfast.  · Reduce your use of caffeine, tobacco, and alcohol. Caffeine is most often found in coffee, tea, cola drinks, and chocolate. · Limit medicines that can cause fatigue. This includes tranquilizers and cold and allergy medicines. When should you call for help? Watch closely for changes in your health, and be sure to contact your doctor if:  ? · You have new symptoms such as fever or a rash. ? · Your fatigue gets worse. ? · You have been feeling down, depressed, or hopeless. Or you may have lost interest in things that you usually enjoy. ? · You are not getting better as expected. Where can you learn more? Go to [email or web address] and sign in to your [employer] account.  Enter L180 in the [employer] box to learn more about

## 2018-03-01 NOTE — PROGRESS NOTES
Topics Concern    Not on file     Social History Narrative    No narrative on file         Past Medical History:   Diagnosis Date    Chicken pox 3/84    Closed fracture of arm age 7    Concussion     Hx of concussions    Concussion     Decorative tattoo     Fall     GERD (gastroesophageal reflux disease)     Hormone replacement therapy     Hypertension     Obese     XAVIER on CPAP     Pericarditis     Pneumonia     Prolonged emergence from general anesthesia     agitation    Umbilical hernia 56/08/6197         Past Surgical History:   Procedure Laterality Date    BRONCHOSCOPY  02/13/2016    CHOLECYSTECTOMY  2007 (?)    HERNIA REPAIR  10/14/2016               Umbilical Hernia Repair    HERNIA REPAIR  69/33/4626    umbilical    TONSILLECTOMY AND ADENOIDECTOMY      TYMPANOSTOMY TUBE PLACEMENT      bilaterally x 1    WISDOM TOOTH EXTRACTION      all 4         Allergies   Allergen Reactions    Prednisone Hives     itchy    Rocephin [Ceftriaxone] Itching         Current Outpatient Prescriptions:     topiramate (TOPAMAX) 25 MG tablet, Take 2 tablets by mouth 2 times daily, Disp: 120 tablet, Rfl: 6    amLODIPine (NORVASC) 10 MG tablet, TAKE ONE TABLET BY MOUTH DAILY, Disp: 90 tablet, Rfl: 3    Multiple Vitamin (MULTIVITAMIN PO), Take  by mouth.  , Disp: , Rfl:       Review of Systems:    Constitutional: Negative for fever, chills, and unexpected weight change. HENT: Negative for congestion, ear pain, rhinorrhea,  sore throat and trouble swallowing. Eyes: Negative for photophobia, redness, itching. Respiratory: Negative for cough, shortness of breath and sputum. Cardiovascular: Negative for chest pain, palpitations and leg swelling. Gastrointestinal: Negative for nausea, vomiting, abdominal pain, diarrhea, constipation. Endocrine: Negative for cold intolerance, heat intolerance, polydipsia, polyphagia and polyuria.    Genitourinary: Negative for dysuria, urgency, frequency, hematuria and depression,  Aggression,  blood clots, prostate hyperplasia/cancer, sleep apnea, acne, and breast enlargement. Men who use a testosterone gel should wash their hands thoroughly after applying a dose and make sure that no one else touches the spots where they medicate. If a woman or child comes into contact with testosterone gels, it can cause side effects in them, including hair growth and premature puberty. Testosterone abuse often occurs with other anabolic androgenic steroids and at higher doses than what is prescribed. Withdrawal symptoms have included fatigue, irritability, loss of appetite, reduced libido, and insomnia.     2: Fatigue   Check Vit D, B12, folate     Recommendations:  Good Sleep Habits  Regular Exercise  Good Nutrition  Stress Reduction  Depression Management  Treatment of Co-existing Illnesses  - Thyroid Support Supplements  No Proven Benefit  - Thyroid Hormone Replacement  No Proven Benefit Unless Hypothyroidism Verified  - Adrenal Support Supplements  No Proven Benefit  - Adrenal Replacement Therapy  Avoid Unless Adrenal Insufficiency Verified  - GH Secretagogues  No Proven Benefit  - HGH Therapy  Avoid Unless GH Deficiency Verified      RTC in 2 months (no labs provided)       Electronically signed by Kami Turk MD on 3/1/2018 at 9:45 AM

## 2018-03-02 ENCOUNTER — HOSPITAL ENCOUNTER (OUTPATIENT)
Dept: OTHER | Age: 38
Discharge: HOME | End: 2018-03-02
Attending: INTERNAL MEDICINE | Admitting: INTERNAL MEDICINE

## 2018-03-02 DIAGNOSIS — R53.83 OTHER FATIGUE: ICD-10-CM

## 2018-03-02 DIAGNOSIS — E29.1 HYPOGONADISM MALE: ICD-10-CM

## 2018-03-02 LAB
FOLATE: 8.28 NG/ML (ref 4.78–24.2)
FOLLICLE STIMULATING HORMONE: 2.2 MIU/ML
LUTEINIZING HORMONE: 4.4 MIU/ML
PROLACTIN: 14.1 NG/ML
T3 FREE: 3.5 PG/ML (ref 2.3–4.2)
T4 FREE: 1.1 NG/DL (ref 0.9–1.8)
TSH SERPL DL<=0.05 MIU/L-ACNC: 1.41 UIU/ML (ref 0.27–4.2)
VITAMIN B-12: 538 PG/ML (ref 211–911)
VITAMIN D 25-HYDROXY: 26.5 NG/ML

## 2018-03-03 LAB
SEX HORMONE BINDING GLOBULIN: 32 NMOL/L (ref 11–80)
TESTOSTERONE FREE PERCENT: 1.8 % (ref 1.6–2.9)
TESTOSTERONE FREE, CALC: 50 PG/ML (ref 47–244)
TESTOSTERONE TOTAL-MALE: 275 NG/DL (ref 300–1080)
TESTOSTERONE, BIOAVAILABLE: 139 NG/DL (ref 131–682)

## 2018-03-04 LAB — MISCELLANEOUS LAB TEST ORDER: NORMAL

## 2018-03-05 DIAGNOSIS — E29.1 HYPOGONADISM MALE: Primary | ICD-10-CM

## 2018-03-19 ENCOUNTER — OFFICE VISIT (OUTPATIENT)
Dept: FAMILY MEDICINE CLINIC | Age: 38
End: 2018-03-19

## 2018-03-19 VITALS
DIASTOLIC BLOOD PRESSURE: 80 MMHG | SYSTOLIC BLOOD PRESSURE: 126 MMHG | BODY MASS INDEX: 39.17 KG/M2 | HEART RATE: 64 BPM | WEIGHT: 315 LBS | OXYGEN SATURATION: 98 % | HEIGHT: 75 IN

## 2018-03-19 DIAGNOSIS — Z01.818 PREOP EXAMINATION: Primary | ICD-10-CM

## 2018-03-19 DIAGNOSIS — Z30.2 REQUEST FOR STERILIZATION: ICD-10-CM

## 2018-03-19 PROCEDURE — G8417 CALC BMI ABV UP PARAM F/U: HCPCS | Performed by: PHYSICIAN ASSISTANT

## 2018-03-19 PROCEDURE — G8484 FLU IMMUNIZE NO ADMIN: HCPCS | Performed by: PHYSICIAN ASSISTANT

## 2018-03-19 PROCEDURE — 99213 OFFICE O/P EST LOW 20 MIN: CPT | Performed by: PHYSICIAN ASSISTANT

## 2018-03-19 PROCEDURE — 1036F TOBACCO NON-USER: CPT | Performed by: PHYSICIAN ASSISTANT

## 2018-03-19 PROCEDURE — G8427 DOCREV CUR MEDS BY ELIG CLIN: HCPCS | Performed by: PHYSICIAN ASSISTANT

## 2018-03-19 NOTE — PROGRESS NOTES
[facility]   Pre-operative History and Physical      DIAGNOSIS:    1. Preop examination     2. Request for sterilization           PROCEDURE:  vasectomy      History Obtained From:  patient    HISTORY OF PRESENT ILLNESS:    Lokesh Lin 1980 is a 30 y.o. male who presents for pre-operative evaluation . Past Medical History:    Past Medical History:   Diagnosis Date    Chicken pox 3/84    Closed fracture of arm age 7    Concussion     Hx of concussions    Concussion     Decorative tattoo     Fall     GERD (gastroesophageal reflux disease)     Hormone replacement therapy     Hypertension     Obese     XAVIER on CPAP     Pericarditis     Pneumonia     Prolonged emergence from general anesthesia     agitation    Umbilical hernia 02/54/6698     Past Surgical History:    Past Surgical History:   Procedure Laterality Date    BRONCHOSCOPY  02/13/2016    CHOLECYSTECTOMY  2007 (?)    HERNIA REPAIR  10/14/2016               Umbilical Hernia Repair    HERNIA REPAIR  07/98/2829    umbilical    TONSILLECTOMY AND ADENOIDECTOMY      TYMPANOSTOMY TUBE PLACEMENT      bilaterally x 1    WISDOM TOOTH EXTRACTION      all 4       Current Medication  Current Outpatient Prescriptions   Medication Sig Dispense Refill    topiramate (TOPAMAX) 25 MG tablet Take 2 tablets by mouth 2 times daily 120 tablet 6    amLODIPine (NORVASC) 10 MG tablet TAKE ONE TABLET BY MOUTH DAILY 90 tablet 3    Multiple Vitamin (MULTIVITAMIN PO) Take  by mouth. No current facility-administered medications for this visit. Allergies:  Prednisone and Rocephin [ceftriaxone]  History of allergic reaction to anesthesia:  No  Teeth: no caps or dentures    Social History:   History   Smoking Status    Former Smoker    Types: Cigars    Quit date: 5/2/2016   Smokeless Tobacco    Never Used     The patient states he drinks 0 per week.   Family History:   Family History   Problem Relation Age of Onset    Other Father      kidney stones    Cancer Father      bladder cancer, ? second hand smoke    Other Mother      bipolar disorder    Bipolar Disorder Mother     Depression Paternal Grandmother     Bipolar Disorder Other     Heart Disease Maternal Aunt     Cancer Maternal Grandfather      Colon       REVIEW OF SYSTEMS:    CONSTITUTIONAL:  negative  EYES:  negative  HEENT:  negative  RESPIRATORY:  negative  CARDIOVASCULAR:  negative  GASTROINTESTINAL:  negative  GENITOURINARY:  negative  INTEGUMENT/BREAST:  negative  HEMATOLOGIC/LYMPHATIC:  negative  ALLERGIC/IMMUNOLOGIC:  negative  ENDOCRINE:  negative  MUSCULOSKELETAL:  negative  NEUROLOGICAL:  negative    PHYSICAL EXAM:      /80   Pulse 64   Ht 6' 3\" (1.905 m)   Wt (!) 352 lb 9.6 oz (159.9 kg)   SpO2 98%   BMI 44.07 kg/m²     Eyes:  Lids and lashes normal, pupils equal, round and reactive to light, extra ocular muscles intact, sclera clear, conjunctiva normal  Head/ENT:  Normocephalic, without obvious abnormality, atraumatic, sinuses nontender on palpation, external ears without lesions, oral pharynx with moist mucus membranes, tonsils without erythema or exudates, gums normal and good dentition. Neck:  Supple, symmetrical, trachea midline, no adenopathy, thyroid symmetric, not enlarged and no tenderness, skin normal  Heart:  Normal apical impulse, regular rate and rhythm, normal S1 and S2, no S3 or S4, and no murmur noted  Lungs:  No increased work of breathing, good air exchange, clear to auscultation bilaterally, no crackles or wheezing  Abdomen:   normal bowel sounds, soft, non-distended, non-tender, no masses palpated, no hepatosplenomegally  Extremities:  No clubbing, cyanosis, or edema      DATA:  No PAT required    ASSESSMENT AND PLAN:    1. Patient is a 30 y.o. male with above specified procedure planned on 4/9/18 with Dr[facility]. 2. Stop asa and nsaid medications 7-10 days prior to procedure.     Malinda Nieto

## 2018-04-19 ENCOUNTER — OFFICE VISIT (OUTPATIENT)
Dept: NEUROLOGY | Age: 38
End: 2018-04-19

## 2018-04-19 VITALS
WEIGHT: 315 LBS | SYSTOLIC BLOOD PRESSURE: 140 MMHG | OXYGEN SATURATION: 97 % | BODY MASS INDEX: 39.17 KG/M2 | HEIGHT: 75 IN | DIASTOLIC BLOOD PRESSURE: 89 MMHG | HEART RATE: 83 BPM

## 2018-04-19 DIAGNOSIS — M50.920 CERVICAL DISC DISORDER OF MID-CERVICAL REGION: Primary | ICD-10-CM

## 2018-04-19 DIAGNOSIS — M54.50 CHRONIC BILATERAL LOW BACK PAIN WITHOUT SCIATICA: ICD-10-CM

## 2018-04-19 DIAGNOSIS — G43.119 INTRACTABLE MIGRAINE WITH AURA WITHOUT STATUS MIGRAINOSUS: ICD-10-CM

## 2018-04-19 DIAGNOSIS — G47.33 OSA ON CPAP: ICD-10-CM

## 2018-04-19 DIAGNOSIS — G89.29 CHRONIC BILATERAL LOW BACK PAIN WITHOUT SCIATICA: ICD-10-CM

## 2018-04-19 DIAGNOSIS — R41.3 MEMORY LOSS: ICD-10-CM

## 2018-04-19 DIAGNOSIS — F07.81 CTE (CHRONIC TRAUMATIC ENCEPHALOPATHY): ICD-10-CM

## 2018-04-19 DIAGNOSIS — Z99.89 OSA ON CPAP: ICD-10-CM

## 2018-04-19 PROCEDURE — 1036F TOBACCO NON-USER: CPT | Performed by: PSYCHIATRY & NEUROLOGY

## 2018-04-19 PROCEDURE — G8427 DOCREV CUR MEDS BY ELIG CLIN: HCPCS | Performed by: PSYCHIATRY & NEUROLOGY

## 2018-04-19 PROCEDURE — G8417 CALC BMI ABV UP PARAM F/U: HCPCS | Performed by: PSYCHIATRY & NEUROLOGY

## 2018-04-19 PROCEDURE — 99214 OFFICE O/P EST MOD 30 MIN: CPT | Performed by: PSYCHIATRY & NEUROLOGY

## 2018-04-27 ENCOUNTER — HOSPITAL ENCOUNTER (OUTPATIENT)
Dept: MRI IMAGING | Age: 38
Discharge: HOME | End: 2018-04-28

## 2018-04-27 ENCOUNTER — HOSPITAL ENCOUNTER (OUTPATIENT)
Dept: MRI IMAGING | Age: 38
Discharge: HOME | End: 2018-04-27

## 2018-04-27 DIAGNOSIS — G89.29 CHRONIC BILATERAL LOW BACK PAIN WITHOUT SCIATICA: ICD-10-CM

## 2018-04-27 DIAGNOSIS — M54.50 CHRONIC BILATERAL LOW BACK PAIN WITHOUT SCIATICA: ICD-10-CM

## 2018-04-27 DIAGNOSIS — M50.920 CERVICAL DISC DISORDER OF MID-CERVICAL REGION: ICD-10-CM

## 2018-05-10 ENCOUNTER — OFFICE VISIT (OUTPATIENT)
Dept: NEUROLOGY | Age: 38
End: 2018-05-10

## 2018-05-10 VITALS
SYSTOLIC BLOOD PRESSURE: 130 MMHG | OXYGEN SATURATION: 96 % | BODY MASS INDEX: 39.17 KG/M2 | HEART RATE: 90 BPM | WEIGHT: 315 LBS | HEIGHT: 75 IN | DIASTOLIC BLOOD PRESSURE: 86 MMHG

## 2018-05-10 DIAGNOSIS — Z99.89 OSA ON CPAP: ICD-10-CM

## 2018-05-10 DIAGNOSIS — G47.33 OSA ON CPAP: ICD-10-CM

## 2018-05-10 DIAGNOSIS — R53.1 WEAKNESS: Primary | ICD-10-CM

## 2018-05-10 DIAGNOSIS — F07.81 CTE (CHRONIC TRAUMATIC ENCEPHALOPATHY): ICD-10-CM

## 2018-05-10 DIAGNOSIS — G43.119 INTRACTABLE MIGRAINE WITH AURA WITHOUT STATUS MIGRAINOSUS: ICD-10-CM

## 2018-05-10 PROCEDURE — G8417 CALC BMI ABV UP PARAM F/U: HCPCS | Performed by: PSYCHIATRY & NEUROLOGY

## 2018-05-10 PROCEDURE — G8427 DOCREV CUR MEDS BY ELIG CLIN: HCPCS | Performed by: PSYCHIATRY & NEUROLOGY

## 2018-05-10 PROCEDURE — 99213 OFFICE O/P EST LOW 20 MIN: CPT | Performed by: PSYCHIATRY & NEUROLOGY

## 2018-05-10 PROCEDURE — 1036F TOBACCO NON-USER: CPT | Performed by: PSYCHIATRY & NEUROLOGY

## 2018-07-13 ENCOUNTER — OFFICE VISIT (OUTPATIENT)
Dept: FAMILY MEDICINE CLINIC | Age: 38
End: 2018-07-13

## 2018-07-13 VITALS
BODY MASS INDEX: 39.17 KG/M2 | TEMPERATURE: 98.3 F | SYSTOLIC BLOOD PRESSURE: 168 MMHG | WEIGHT: 315 LBS | HEART RATE: 88 BPM | DIASTOLIC BLOOD PRESSURE: 92 MMHG | OXYGEN SATURATION: 96 % | HEIGHT: 75 IN

## 2018-07-13 DIAGNOSIS — L30.9 DERMATITIS: Primary | ICD-10-CM

## 2018-07-13 PROCEDURE — G8427 DOCREV CUR MEDS BY ELIG CLIN: HCPCS | Performed by: PHYSICIAN ASSISTANT

## 2018-07-13 PROCEDURE — G8417 CALC BMI ABV UP PARAM F/U: HCPCS | Performed by: PHYSICIAN ASSISTANT

## 2018-07-13 PROCEDURE — 1036F TOBACCO NON-USER: CPT | Performed by: PHYSICIAN ASSISTANT

## 2018-07-13 PROCEDURE — 99213 OFFICE O/P EST LOW 20 MIN: CPT | Performed by: PHYSICIAN ASSISTANT

## 2018-07-13 RX ORDER — HYDROXYZINE HYDROCHLORIDE 25 MG/1
25 TABLET, FILM COATED ORAL 3 TIMES DAILY PRN
Qty: 30 TABLET | Refills: 0 | Status: SHIPPED | OUTPATIENT
Start: 2018-07-13 | End: 2018-07-23

## 2018-07-13 ASSESSMENT — ENCOUNTER SYMPTOMS: RESPIRATORY NEGATIVE: 1

## 2018-07-13 NOTE — PROGRESS NOTES
Subjective:      Patient ID: Ghassan Beyer is a 41 y.o. male. HPI  Patient presents with rash that started on Tuesday of bilateral lower legs. He has been at a tournament and was out in the sun and a lot of standing. Legs felt swollen but did not look to swollen. The legs itch and have red raised lesions present. Review of Systems   Constitutional: Negative. Respiratory: Negative. Cardiovascular: Negative. Skin: Positive for rash. Lower leg mid calf to ankles       Objective:   Physical Exam   Constitutional: He appears well-developed. Cardiovascular: Normal rate and regular rhythm. Pulmonary/Chest: Effort normal and breath sounds normal.   Skin:   Mid calf to ankle patches or slight raised erythematous plaques, rough in texture       Assessment:       Diagnosis Orders   1. Dermatitis             Plan:      Atarax for itching and hydro cortisone cream. Patient is to call if no improvement or signs and symptoms worsen.

## 2018-09-10 DIAGNOSIS — I10 ESSENTIAL HYPERTENSION: ICD-10-CM

## 2018-09-11 NOTE — TELEPHONE ENCOUNTER
Kaylin Gloria is requesting refill(s) amlodipine  Last OV 1/23/18 (pertaining to medication)  LR 1/23/18 (per medication requested)  Next office visit scheduled or attempted No   If no, reason:  Pt is not scheduled

## 2018-09-12 DIAGNOSIS — I10 ESSENTIAL HYPERTENSION: ICD-10-CM

## 2018-09-12 RX ORDER — AMLODIPINE BESYLATE 10 MG/1
TABLET ORAL
Qty: 30 TABLET | Refills: 5 | Status: SHIPPED | OUTPATIENT
Start: 2018-09-12 | End: 2019-04-25 | Stop reason: SDUPTHER

## 2018-09-12 RX ORDER — AMLODIPINE BESYLATE 10 MG/1
TABLET ORAL
Qty: 30 TABLET | Refills: 5 | Status: SHIPPED | OUTPATIENT
Start: 2018-09-12 | End: 2018-09-12 | Stop reason: SDUPTHER

## 2019-01-08 ENCOUNTER — OFFICE VISIT (OUTPATIENT)
Dept: FAMILY MEDICINE CLINIC | Age: 39
End: 2019-01-08
Payer: COMMERCIAL

## 2019-01-08 VITALS
BODY MASS INDEX: 39.17 KG/M2 | WEIGHT: 315 LBS | HEART RATE: 70 BPM | OXYGEN SATURATION: 98 % | HEIGHT: 75 IN | DIASTOLIC BLOOD PRESSURE: 64 MMHG | SYSTOLIC BLOOD PRESSURE: 120 MMHG

## 2019-01-08 DIAGNOSIS — R10.13 EPIGASTRIC PAIN: ICD-10-CM

## 2019-01-08 DIAGNOSIS — R14.0 BLOATING: Primary | ICD-10-CM

## 2019-01-08 PROCEDURE — G8427 DOCREV CUR MEDS BY ELIG CLIN: HCPCS | Performed by: PHYSICIAN ASSISTANT

## 2019-01-08 PROCEDURE — G8417 CALC BMI ABV UP PARAM F/U: HCPCS | Performed by: PHYSICIAN ASSISTANT

## 2019-01-08 PROCEDURE — 99213 OFFICE O/P EST LOW 20 MIN: CPT | Performed by: PHYSICIAN ASSISTANT

## 2019-01-08 PROCEDURE — G8484 FLU IMMUNIZE NO ADMIN: HCPCS | Performed by: PHYSICIAN ASSISTANT

## 2019-01-08 PROCEDURE — 1036F TOBACCO NON-USER: CPT | Performed by: PHYSICIAN ASSISTANT

## 2019-01-08 ASSESSMENT — ENCOUNTER SYMPTOMS
ABDOMINAL DISTENTION: 1
VOMITING: 0
ABDOMINAL PAIN: 1
NAUSEA: 0
RESPIRATORY NEGATIVE: 1
BLOOD IN STOOL: 0

## 2019-01-27 ENCOUNTER — APPOINTMENT (OUTPATIENT)
Dept: GENERAL RADIOLOGY | Age: 39
End: 2019-01-27
Payer: COMMERCIAL

## 2019-01-27 ENCOUNTER — HOSPITAL ENCOUNTER (EMERGENCY)
Age: 39
Discharge: HOME | End: 2019-01-27
Attending: EMERGENCY MEDICINE
Payer: COMMERCIAL

## 2019-01-27 VITALS
HEIGHT: 75 IN | BODY MASS INDEX: 39.17 KG/M2 | WEIGHT: 315 LBS | RESPIRATION RATE: 20 BRPM | OXYGEN SATURATION: 96 % | TEMPERATURE: 99.5 F | DIASTOLIC BLOOD PRESSURE: 91 MMHG | HEART RATE: 102 BPM | SYSTOLIC BLOOD PRESSURE: 149 MMHG

## 2019-01-27 DIAGNOSIS — J40 BRONCHITIS: Primary | ICD-10-CM

## 2019-01-27 PROCEDURE — 6370000000 HC RX 637 (ALT 250 FOR IP): Performed by: EMERGENCY MEDICINE

## 2019-01-27 PROCEDURE — 94640 AIRWAY INHALATION TREATMENT: CPT

## 2019-01-27 PROCEDURE — 71046 X-RAY EXAM CHEST 2 VIEWS: CPT

## 2019-01-27 PROCEDURE — 99283 EMERGENCY DEPT VISIT LOW MDM: CPT

## 2019-01-27 PROCEDURE — 94664 DEMO&/EVAL PT USE INHALER: CPT

## 2019-01-27 RX ORDER — AMOXICILLIN AND CLAVULANATE POTASSIUM 875; 125 MG/1; MG/1
1 TABLET, FILM COATED ORAL ONCE
Status: COMPLETED | OUTPATIENT
Start: 2019-01-27 | End: 2019-01-27

## 2019-01-27 RX ORDER — GUAIFENESIN/DEXTROMETHORPHAN 100-10MG/5
10 SYRUP ORAL EVERY 6 HOURS PRN
Qty: 120 ML | Refills: 0 | Status: SHIPPED | OUTPATIENT
Start: 2019-01-27 | End: 2019-06-24 | Stop reason: ALTCHOICE

## 2019-01-27 RX ORDER — ALBUTEROL SULFATE 90 UG/1
2 AEROSOL, METERED RESPIRATORY (INHALATION) EVERY 4 HOURS PRN
Qty: 1 INHALER | Refills: 0 | Status: SHIPPED | OUTPATIENT
Start: 2019-01-27 | End: 2020-08-06

## 2019-01-27 RX ORDER — AMOXICILLIN AND CLAVULANATE POTASSIUM 875; 125 MG/1; MG/1
1 TABLET, FILM COATED ORAL 2 TIMES DAILY
Qty: 14 TABLET | Refills: 0 | Status: SHIPPED | OUTPATIENT
Start: 2019-01-27 | End: 2019-02-03

## 2019-01-27 RX ORDER — GUAIFENESIN/DEXTROMETHORPHAN 100-10MG/5
10 SYRUP ORAL ONCE
Status: COMPLETED | OUTPATIENT
Start: 2019-01-27 | End: 2019-01-27

## 2019-01-27 RX ORDER — ACETAMINOPHEN 500 MG
1000 TABLET ORAL ONCE
Status: COMPLETED | OUTPATIENT
Start: 2019-01-27 | End: 2019-01-27

## 2019-01-27 RX ORDER — IPRATROPIUM BROMIDE AND ALBUTEROL SULFATE 2.5; .5 MG/3ML; MG/3ML
1 SOLUTION RESPIRATORY (INHALATION) ONCE
Status: COMPLETED | OUTPATIENT
Start: 2019-01-27 | End: 2019-01-27

## 2019-01-27 RX ADMIN — AMOXICILLIN AND CLAVULANATE POTASSIUM 1 TABLET: 875; 125 TABLET, FILM COATED ORAL at 09:52

## 2019-01-27 RX ADMIN — ACETAMINOPHEN 1000 MG: 500 TABLET ORAL at 09:52

## 2019-01-27 RX ADMIN — IPRATROPIUM BROMIDE AND ALBUTEROL SULFATE 1 AMPULE: .5; 3 SOLUTION RESPIRATORY (INHALATION) at 09:08

## 2019-01-27 RX ADMIN — GUAIFENESIN AND DEXTROMETHORPHAN 10 ML: 100; 10 SYRUP ORAL at 09:52

## 2019-01-27 ASSESSMENT — PAIN SCALES - GENERAL
PAINLEVEL_OUTOF10: 3
PAINLEVEL_OUTOF10: 3

## 2019-02-25 ENCOUNTER — APPOINTMENT (OUTPATIENT)
Dept: GENERAL RADIOLOGY | Age: 39
End: 2019-02-25
Payer: MEDICAID

## 2019-02-25 ENCOUNTER — HOSPITAL ENCOUNTER (EMERGENCY)
Age: 39
Discharge: HOME | End: 2019-02-25
Payer: MEDICAID

## 2019-02-25 VITALS
DIASTOLIC BLOOD PRESSURE: 97 MMHG | HEART RATE: 87 BPM | HEIGHT: 75 IN | TEMPERATURE: 97.5 F | SYSTOLIC BLOOD PRESSURE: 150 MMHG | RESPIRATION RATE: 18 BRPM | OXYGEN SATURATION: 96 % | WEIGHT: 315 LBS | BODY MASS INDEX: 39.17 KG/M2

## 2019-02-25 DIAGNOSIS — R05.3 PERSISTENT COUGH FOR 3 WEEKS OR LONGER: ICD-10-CM

## 2019-02-25 DIAGNOSIS — S29.011A CHEST WALL MUSCLE STRAIN, INITIAL ENCOUNTER: Primary | ICD-10-CM

## 2019-02-25 PROCEDURE — 99283 EMERGENCY DEPT VISIT LOW MDM: CPT

## 2019-02-25 PROCEDURE — 94150 VITAL CAPACITY TEST: CPT

## 2019-02-25 PROCEDURE — 71100 X-RAY EXAM RIBS UNI 2 VIEWS: CPT

## 2019-02-25 PROCEDURE — 71046 X-RAY EXAM CHEST 2 VIEWS: CPT

## 2019-02-25 RX ORDER — HYDROCODONE BITARTRATE AND ACETAMINOPHEN 5; 325 MG/1; MG/1
1 TABLET ORAL EVERY 6 HOURS PRN
Qty: 10 TABLET | Refills: 0 | Status: SHIPPED | OUTPATIENT
Start: 2019-02-25 | End: 2019-02-28

## 2019-02-25 ASSESSMENT — PAIN SCALES - GENERAL: PAINLEVEL_OUTOF10: 2

## 2019-02-25 ASSESSMENT — PAIN DESCRIPTION - PAIN TYPE: TYPE: ACUTE PAIN

## 2019-02-26 NOTE — ED PROVIDER NOTES
**[facility]  eMERGENCY dEPARTMENT eNCOUnter      Pt Name: Amelia Mcintyre  ILN:[id number]  Tenagfderick 1980  Date of evaluation: 2/25/2019  Provider: Lizet Oakley PA-C      Chief Complaint:    Chief Complaint   Patient presents with    Cough     pt sts on thursday he coughed pretty hard and heard a pop. sts it has been hurting ever since. mainly hurts on the LT side. sts he can't really take a deep breathe. having trouble sleeping cause when he lays it hurts. recently on antibiotics for pneumonia. Nursing Notes, Past Medical Hx, Past Surgical Hx, Social Hx, Allergies, and Family Hx were all reviewed and agreed with or any disagreements were addressed in the HPI.    HPI:  (Location, Duration, Timing, Severity,Quality, Assoc Sx, Context, Modifying factors)  This is a  41 y.o. male patient presenting with complaint of left chest wall pain. The patient states recently he was seen at [facility] ER diagnosed pneumonia treated with Augmentin. Patient had minimal improvement. He went to [place] on business. While out there is symptoms worsen he was seen at a ER facility in [facility] and given azithromycin and Tessalon. His symptoms improved. He had a mild residual cough that lingered. This past Thursday or about 4 days ago he states he had a hard cough and sensitivities had progressive pain in the left chest wall. It is worse with movement and deep breath. He is not short of breath. Denies any fevers, chills and he does report the cough overall is improving.     PastMedical/Surgical History:      Diagnosis Date    Chicken pox 3/84    Closed fracture of arm age 13    Concussion     Hx of concussions    Concussion     Decorative tattoo     Fall     GERD (gastroesophageal reflux disease)     Hormone replacement therapy     Hypertension     Obese     XAVIER on CPAP     Pericarditis     Pneumonia     Prolonged emergence from general anesthesia     agitation    Umbilical hernia 02/08/4719         Procedure Laterality Date    BRONCHOSCOPY  02/13/2016    CHOLECYSTECTOMY  2007 (?)    HERNIA REPAIR  10/14/2016               Umbilical Hernia Repair    HERNIA REPAIR  84/19/5181    umbilical    TONSILLECTOMY AND ADENOIDECTOMY      TYMPANOSTOMY TUBE PLACEMENT      bilaterally x 1    WISDOM TOOTH EXTRACTION      all 4       Medications:  Previous Medications    ALBUTEROL SULFATE  (90 BASE) MCG/ACT INHALER    Inhale 2 puffs into the lungs every 4 hours as needed for Wheezing With spacer chamber    AMLODIPINE (NORVASC) 10 MG TABLET    TAKE ONE TABLET BY MOUTH DAILY    GUAIFENESIN-DEXTROMETHORPHAN (ROBITUSSIN DM) 100-10 MG/5ML SYRUP    Take 10 mLs by mouth every 6 hours as needed for Cough    MULTIPLE VITAMIN (MULTIVITAMIN PO)    Take  by mouth. Review of Systems:  Review of Systems  Positives and Pertinent negatives as per HPI. Except as noted above in the ROS, problem specific ROS was completed and is negative. Physical Exam:  Physical Exam   Constitutional: He is oriented to person, place, and time. He appears well-developed and well-nourished. HENT:   Head: Normocephalic. Right Ear: External ear normal.   Left Ear: External ear normal.   Mouth/Throat: Oropharynx is clear and moist.   Eyes: Conjunctivae are normal. Right eye exhibits no discharge. Left eye exhibits no discharge. No scleral icterus. Neck: Normal range of motion. Neck supple. Cardiovascular: Normal rate, regular rhythm and normal heart sounds. Pulmonary/Chest: Effort normal and breath sounds normal. He exhibits tenderness. Abdominal: Soft. Bowel sounds are normal.   Musculoskeletal: Normal range of motion. Neurological: He is alert and oriented to person, place, and time. Skin: Skin is warm and dry. Psychiatric: He has a normal mood and affect.  His behavior is normal. Judgment and thought content normal.   Nursing note prior studies. Finding may merely represent overlap/crowded vessels associated with expiratory technique however focal area parenchymal disease is also a possibility. Lungs are otherwise clear. No evidence of pleural effusion or pneumothorax. Focal area of increased density overlying posterior margin of cardiac silhouette on the lateral view. While finding may be related to overlap associated with expiratory technique, focal area of basilar parenchymal disease/atelectasis is also a possibility. Short-term follow-up examination with better inspiratory effort would be helpful for re-evaluation. MEDICAL DECISION MAKING / ED COURSE:      PROCEDURES:   Procedures    None    Patient was given:  Medications - No data to display    The patient presenting with complaint of chest wall pain lateral aspect occurring last Thursday for heart cough. 4 weeks ago diagnosed pneumonia treated with Augmentin. Minimal improvement. Out of town in [place] and seen at facility out there and they gave him a prescription for Z-Ryan and Tessalon. Overall his symptoms have improved. The heart cough on Thursday of last week cause progressive left chest wall pain. No crepitation. No instability. Breath sounds are equal.  X-ray shows no evidence of pneumothorax or pneumonia or any evidence of rib fracture at this time. The patient be discharged with Norco 5 mg #10. I did obtain a review a state report. Incentive spirometer unit provided. The patient expresses understanding of this diagnosis and treatment plan. The patient tolerated their visit well. I evaluated the patient. The physician was available for consultation as needed. The patient and / or the family were informed of the results of anytests, a time was given to answer questions, a plan was proposed and they agreed with plan. CLINICAL IMPRESSION:  1. Chest wall muscle strain, initial encounter    2.  Persistent cough for 3 weeks or longer DISPOSITION Decision To Discharge 02/25/2019 10:43:29 PM      PATIENT REFERRED TO:  Kristi Christy MD  [address]  [phone]    Schedule an appointment as soon as possible for a visit in 3 days      [facility] [place] [facility]  [address] [facility]  Go to   If symptoms worsen      DISCHARGE MEDICATIONS:  New Prescriptions    HYDROCODONE-ACETAMINOPHEN (NORCO) 5-325 MG PER TABLET    Take 1 tablet by mouth every 6 hours as needed for Pain for up to 3 days. Scar Chapman DISCONTINUED MEDICATIONS:  Discontinued Medications    No medications on file       Attestation: The Prescription Monitoring Report for this patient was reviewed today. Angel Drake PA-C)  Documentation: Obtaining appropriate analgesic effect of treatment., No signs of potential drug abuse or diversion identified.  Angel Drake PA-C)      (Please note the MDM and HPI sections of this note were completed with a voice recognition program.  Efforts weremade to edit the dictations but occasionally words are mis-transcribed.)    Electronically signed, Angel Drake PA-C,          Angel Drake PA-C  02/25/19 2609

## 2019-04-25 DIAGNOSIS — I10 ESSENTIAL HYPERTENSION: ICD-10-CM

## 2019-04-26 RX ORDER — AMLODIPINE BESYLATE 10 MG/1
TABLET ORAL
Qty: 30 TABLET | Refills: 4 | Status: SHIPPED | OUTPATIENT
Start: 2019-04-26 | End: 2019-10-03 | Stop reason: SDUPTHER

## 2019-04-26 NOTE — TELEPHONE ENCOUNTER
Bárbara Gil [phone] (home) [phone] (work)   is requesting refill(s) of medication Amlodipine to preferred pharmacy [place] [phone] 1/8/19 (pertaining to medication)   Last refill 9/12/18 (per medication requested)  Next office visit scheduled or attempted No  Date   If No, reason  Pt is not scheduled

## 2019-05-06 ENCOUNTER — OFFICE VISIT (OUTPATIENT)
Dept: FAMILY MEDICINE CLINIC | Age: 39
End: 2019-05-06
Payer: COMMERCIAL

## 2019-05-06 VITALS
WEIGHT: 315 LBS | HEIGHT: 75 IN | BODY MASS INDEX: 39.17 KG/M2 | SYSTOLIC BLOOD PRESSURE: 150 MMHG | DIASTOLIC BLOOD PRESSURE: 88 MMHG | OXYGEN SATURATION: 98 % | HEART RATE: 102 BPM

## 2019-05-06 DIAGNOSIS — I10 ESSENTIAL HYPERTENSION: Primary | ICD-10-CM

## 2019-05-06 LAB
BILIRUBIN, POC: NEGATIVE
BLOOD URINE, POC: NEGATIVE
CLARITY, POC: NORMAL
COLOR, POC: NORMAL
GLUCOSE URINE, POC: NEGATIVE
KETONES, POC: NEGATIVE
LEUKOCYTE EST, POC: NEGATIVE
NITRITE, POC: NORMAL
PH, POC: 7
PROTEIN, POC: NEGATIVE
SPECIFIC GRAVITY, POC: 1.02
UROBILINOGEN, POC: 0.2

## 2019-05-06 PROCEDURE — G8427 DOCREV CUR MEDS BY ELIG CLIN: HCPCS | Performed by: PHYSICIAN ASSISTANT

## 2019-05-06 PROCEDURE — 99213 OFFICE O/P EST LOW 20 MIN: CPT | Performed by: PHYSICIAN ASSISTANT

## 2019-05-06 PROCEDURE — G8417 CALC BMI ABV UP PARAM F/U: HCPCS | Performed by: PHYSICIAN ASSISTANT

## 2019-05-06 PROCEDURE — 1036F TOBACCO NON-USER: CPT | Performed by: PHYSICIAN ASSISTANT

## 2019-05-06 PROCEDURE — 81002 URINALYSIS NONAUTO W/O SCOPE: CPT | Performed by: PHYSICIAN ASSISTANT

## 2019-05-06 RX ORDER — PANTOPRAZOLE SODIUM 40 MG/1
40 TABLET, DELAYED RELEASE ORAL 2 TIMES DAILY
COMMUNITY
Start: 2019-04-26 | End: 2022-03-15

## 2019-05-06 RX ORDER — LISINOPRIL 5 MG/1
5 TABLET ORAL DAILY
Qty: 30 TABLET | Refills: 1 | Status: SHIPPED | OUTPATIENT
Start: 2019-05-06 | End: 2019-07-02 | Stop reason: SDUPTHER

## 2019-05-06 ASSESSMENT — PATIENT HEALTH QUESTIONNAIRE - PHQ9
2. FEELING DOWN, DEPRESSED OR HOPELESS: 0
SUM OF ALL RESPONSES TO PHQ QUESTIONS 1-9: 0
SUM OF ALL RESPONSES TO PHQ QUESTIONS 1-9: 0
SUM OF ALL RESPONSES TO PHQ9 QUESTIONS 1 & 2: 0
1. LITTLE INTEREST OR PLEASURE IN DOING THINGS: 0

## 2019-06-24 ENCOUNTER — OFFICE VISIT (OUTPATIENT)
Dept: FAMILY MEDICINE CLINIC | Age: 39
End: 2019-06-24
Payer: COMMERCIAL

## 2019-06-24 VITALS
BODY MASS INDEX: 39.17 KG/M2 | HEIGHT: 75 IN | DIASTOLIC BLOOD PRESSURE: 62 MMHG | OXYGEN SATURATION: 99 % | WEIGHT: 315 LBS | SYSTOLIC BLOOD PRESSURE: 148 MMHG | HEART RATE: 107 BPM

## 2019-06-24 DIAGNOSIS — I10 ESSENTIAL HYPERTENSION: Primary | ICD-10-CM

## 2019-06-24 DIAGNOSIS — Z13.220 SCREENING CHOLESTEROL LEVEL: ICD-10-CM

## 2019-06-24 DIAGNOSIS — R63.5 WEIGHT GAIN: ICD-10-CM

## 2019-06-24 LAB
A/G RATIO: 1.5 (ref 1.1–2.2)
ALBUMIN SERPL-MCNC: 4.1 G/DL (ref 3.4–5)
ALP BLD-CCNC: 66 U/L (ref 40–129)
ALT SERPL-CCNC: 68 U/L (ref 10–40)
ANION GAP SERPL CALCULATED.3IONS-SCNC: 11 MMOL/L (ref 3–16)
AST SERPL-CCNC: 32 U/L (ref 15–37)
BILIRUB SERPL-MCNC: 0.3 MG/DL (ref 0–1)
BUN BLDV-MCNC: 14 MG/DL (ref 7–20)
CALCIUM SERPL-MCNC: 8.9 MG/DL (ref 8.3–10.6)
CHLORIDE BLD-SCNC: 104 MMOL/L (ref 99–110)
CHOLESTEROL, TOTAL: 137 MG/DL (ref 0–199)
CO2: 23 MMOL/L (ref 21–32)
CREAT SERPL-MCNC: 1 MG/DL (ref 0.9–1.3)
GFR AFRICAN AMERICAN: >60
GFR NON-AFRICAN AMERICAN: >60
GLOBULIN: 2.7 G/DL
GLUCOSE BLD-MCNC: 171 MG/DL (ref 70–99)
HDLC SERPL-MCNC: 25 MG/DL (ref 40–60)
LDL CHOLESTEROL CALCULATED: 68 MG/DL
POTASSIUM SERPL-SCNC: 4.4 MMOL/L (ref 3.5–5.1)
SODIUM BLD-SCNC: 138 MMOL/L (ref 136–145)
T4 TOTAL: 5.6 UG/DL (ref 4.5–10.9)
TOTAL PROTEIN: 6.8 G/DL (ref 6.4–8.2)
TRIGL SERPL-MCNC: 222 MG/DL (ref 0–150)
TSH SERPL DL<=0.05 MIU/L-ACNC: 1.85 UIU/ML (ref 0.27–4.2)
VLDLC SERPL CALC-MCNC: 44 MG/DL

## 2019-06-24 PROCEDURE — G8417 CALC BMI ABV UP PARAM F/U: HCPCS | Performed by: PHYSICIAN ASSISTANT

## 2019-06-24 PROCEDURE — 99213 OFFICE O/P EST LOW 20 MIN: CPT | Performed by: PHYSICIAN ASSISTANT

## 2019-06-24 PROCEDURE — G8427 DOCREV CUR MEDS BY ELIG CLIN: HCPCS | Performed by: PHYSICIAN ASSISTANT

## 2019-06-24 PROCEDURE — 1036F TOBACCO NON-USER: CPT | Performed by: PHYSICIAN ASSISTANT

## 2019-06-24 PROCEDURE — 36415 COLL VENOUS BLD VENIPUNCTURE: CPT | Performed by: PHYSICIAN ASSISTANT

## 2019-06-24 NOTE — PROGRESS NOTES
Subjective:   Dai Xiong is a 42 y.o. male with hypertension. Current Outpatient Medications   Medication Sig Dispense Refill    pantoprazole (PROTONIX) 40 MG tablet       lisinopril (PRINIVIL;ZESTRIL) 5 MG tablet Take 1 tablet by mouth daily 30 tablet 1    amLODIPine (NORVASC) 10 MG tablet TAKE ONE TABLET BY MOUTH DAILY 30 tablet 4    albuterol sulfate  (90 Base) MCG/ACT inhaler Inhale 2 puffs into the lungs every 4 hours as needed for Wheezing With spacer chamber 1 Inhaler 0    Multiple Vitamin (MULTIVITAMIN PO) Take  by mouth. No current facility-administered medications for this visit. Hypertension ROS: taking medications as instructed, no medication side effects noted, no TIA's, no chest pain on exertion, no dyspnea on exertion, no swelling of ankles. New concerns: has continued to gain weight at a quick pace. He quit taking testosterone (sees a hormone doctor in [place]). Feels more bloated. Has been working out and has cleaned up his diet. Objective:   BP (!) 146/62   Pulse 107   Ht 6' 3\" (1.905 m)   Wt (!) 415 lb 3.2 oz (188.3 kg)   SpO2 99%   BMI 51.90 kg/m²    Appearance alert, well appearing, and in no distress, oriented to person, place, and time and overweight. General exam BP noted to be well controlled today in office, S1, S2 normal, no gallop, no murmur, chest clear, no JVD, no HSM, no edema. Assessment:       Diagnosis Orders   1. Essential hypertension  Comprehensive Metabolic Panel   2. Weight gain  T4    TSH without Reflex   3. Screening cholesterol level  Lipid Panel       Plan:   BP stable, normal check at home 130/60-70. Will check labs today. Consider seeing endocrine due to the rate at which he is gaining weight.

## 2019-07-02 DIAGNOSIS — I10 ESSENTIAL HYPERTENSION: ICD-10-CM

## 2019-07-02 DIAGNOSIS — R73.09 ELEVATED GLUCOSE: Primary | ICD-10-CM

## 2019-07-03 RX ORDER — LISINOPRIL 5 MG/1
TABLET ORAL
Qty: 30 TABLET | Refills: 0 | Status: SHIPPED | OUTPATIENT
Start: 2019-07-03 | End: 2019-08-04 | Stop reason: SDUPTHER

## 2019-07-05 NOTE — TELEPHONE ENCOUNTER
Patient returned call. I read him the note from Kar Sanchez, lab appointment scheduled, orders please. Also advised the prescription for the Lisinopril was sent to the pharmacy on 7-3-19. He understood.

## 2019-08-04 DIAGNOSIS — I10 ESSENTIAL HYPERTENSION: ICD-10-CM

## 2019-08-05 RX ORDER — LISINOPRIL 5 MG/1
TABLET ORAL
Qty: 30 TABLET | Refills: 0 | Status: SHIPPED | OUTPATIENT
Start: 2019-08-05 | End: 2019-08-23 | Stop reason: SDUPTHER

## 2019-08-15 ENCOUNTER — TELEPHONE (OUTPATIENT)
Dept: FAMILY MEDICINE CLINIC | Age: 39
End: 2019-08-15

## 2019-08-23 ENCOUNTER — OFFICE VISIT (OUTPATIENT)
Dept: FAMILY MEDICINE CLINIC | Age: 39
End: 2019-08-23
Payer: COMMERCIAL

## 2019-08-23 VITALS
BODY MASS INDEX: 39.17 KG/M2 | OXYGEN SATURATION: 98 % | WEIGHT: 315 LBS | DIASTOLIC BLOOD PRESSURE: 88 MMHG | HEIGHT: 75 IN | SYSTOLIC BLOOD PRESSURE: 141 MMHG | HEART RATE: 100 BPM

## 2019-08-23 DIAGNOSIS — I10 ESSENTIAL HYPERTENSION: ICD-10-CM

## 2019-08-23 DIAGNOSIS — R73.09 ELEVATED GLUCOSE: Primary | ICD-10-CM

## 2019-08-23 LAB — HBA1C MFR BLD: 5.4 %

## 2019-08-23 PROCEDURE — 99213 OFFICE O/P EST LOW 20 MIN: CPT | Performed by: PHYSICIAN ASSISTANT

## 2019-08-23 PROCEDURE — G8427 DOCREV CUR MEDS BY ELIG CLIN: HCPCS | Performed by: PHYSICIAN ASSISTANT

## 2019-08-23 PROCEDURE — G8417 CALC BMI ABV UP PARAM F/U: HCPCS | Performed by: PHYSICIAN ASSISTANT

## 2019-08-23 PROCEDURE — 1036F TOBACCO NON-USER: CPT | Performed by: PHYSICIAN ASSISTANT

## 2019-08-23 PROCEDURE — 83036 HEMOGLOBIN GLYCOSYLATED A1C: CPT | Performed by: PHYSICIAN ASSISTANT

## 2019-08-23 RX ORDER — LISINOPRIL 5 MG/1
TABLET ORAL
Qty: 90 TABLET | Refills: 1 | Status: SHIPPED | OUTPATIENT
Start: 2019-08-23 | End: 2020-03-04 | Stop reason: SDUPTHER

## 2019-08-23 ASSESSMENT — ENCOUNTER SYMPTOMS: RESPIRATORY NEGATIVE: 1

## 2019-10-03 DIAGNOSIS — I10 ESSENTIAL HYPERTENSION: ICD-10-CM

## 2019-10-08 RX ORDER — AMLODIPINE BESYLATE 10 MG/1
TABLET ORAL
Qty: 30 TABLET | Refills: 3 | Status: SHIPPED | OUTPATIENT
Start: 2019-10-08 | End: 2020-02-07

## 2020-02-07 RX ORDER — AMLODIPINE BESYLATE 10 MG/1
TABLET ORAL
Qty: 30 TABLET | Refills: 2 | Status: SHIPPED | OUTPATIENT
Start: 2020-02-07 | End: 2020-05-13

## 2020-02-07 NOTE — TELEPHONE ENCOUNTER
Patient needs a 6 month f/u wants to [facility] with Klarissa Martini due this month to come in. Please advise.

## 2020-02-24 NOTE — TELEPHONE ENCOUNTER
Left message advising patient that Vanessa MOTA has accepted him as a patient and to call our office to schedule his medication f/u (New to Provider).

## 2020-03-04 ENCOUNTER — OFFICE VISIT (OUTPATIENT)
Dept: FAMILY MEDICINE CLINIC | Age: 40
End: 2020-03-04
Payer: COMMERCIAL

## 2020-03-04 VITALS
OXYGEN SATURATION: 98 % | SYSTOLIC BLOOD PRESSURE: 144 MMHG | WEIGHT: 315 LBS | DIASTOLIC BLOOD PRESSURE: 95 MMHG | HEIGHT: 75 IN | BODY MASS INDEX: 39.17 KG/M2 | HEART RATE: 92 BPM

## 2020-03-04 LAB
A/G RATIO: 1.2 (ref 1.1–2.2)
ALBUMIN SERPL-MCNC: 4.1 G/DL (ref 3.4–5)
ALP BLD-CCNC: 41 U/L (ref 40–129)
ALT SERPL-CCNC: 60 U/L (ref 10–40)
ANION GAP SERPL CALCULATED.3IONS-SCNC: 14 MMOL/L (ref 3–16)
AST SERPL-CCNC: 44 U/L (ref 15–37)
BILIRUB SERPL-MCNC: 0.3 MG/DL (ref 0–1)
BUN BLDV-MCNC: 9 MG/DL (ref 7–20)
CALCIUM SERPL-MCNC: 8.9 MG/DL (ref 8.3–10.6)
CHLORIDE BLD-SCNC: 101 MMOL/L (ref 99–110)
CHOLESTEROL, TOTAL: 206 MG/DL (ref 0–199)
CO2: 22 MMOL/L (ref 21–32)
CREAT SERPL-MCNC: 1 MG/DL (ref 0.9–1.3)
GFR AFRICAN AMERICAN: >60
GFR NON-AFRICAN AMERICAN: >60
GLOBULIN: 3.3 G/DL
GLUCOSE BLD-MCNC: 94 MG/DL (ref 70–99)
HBA1C MFR BLD: 5.4 %
HDLC SERPL-MCNC: 13 MG/DL (ref 40–60)
LDL CHOLESTEROL CALCULATED: 151 MG/DL
POTASSIUM SERPL-SCNC: 4.6 MMOL/L (ref 3.5–5.1)
SODIUM BLD-SCNC: 137 MMOL/L (ref 136–145)
TOTAL PROTEIN: 7.4 G/DL (ref 6.4–8.2)
TRIGL SERPL-MCNC: 212 MG/DL (ref 0–150)
VLDLC SERPL CALC-MCNC: 42 MG/DL

## 2020-03-04 PROCEDURE — 83036 HEMOGLOBIN GLYCOSYLATED A1C: CPT | Performed by: PHYSICIAN ASSISTANT

## 2020-03-04 PROCEDURE — G8427 DOCREV CUR MEDS BY ELIG CLIN: HCPCS | Performed by: PHYSICIAN ASSISTANT

## 2020-03-04 PROCEDURE — 99213 OFFICE O/P EST LOW 20 MIN: CPT | Performed by: PHYSICIAN ASSISTANT

## 2020-03-04 PROCEDURE — G8417 CALC BMI ABV UP PARAM F/U: HCPCS | Performed by: PHYSICIAN ASSISTANT

## 2020-03-04 PROCEDURE — 36415 COLL VENOUS BLD VENIPUNCTURE: CPT | Performed by: PHYSICIAN ASSISTANT

## 2020-03-04 PROCEDURE — G8484 FLU IMMUNIZE NO ADMIN: HCPCS | Performed by: PHYSICIAN ASSISTANT

## 2020-03-04 PROCEDURE — 1036F TOBACCO NON-USER: CPT | Performed by: PHYSICIAN ASSISTANT

## 2020-03-04 RX ORDER — AZITHROMYCIN 250 MG/1
250 TABLET, FILM COATED ORAL SEE ADMIN INSTRUCTIONS
Qty: 6 TABLET | Refills: 0 | Status: SHIPPED | OUTPATIENT
Start: 2020-03-04 | End: 2020-03-09

## 2020-03-04 RX ORDER — LISINOPRIL 5 MG/1
TABLET ORAL
Qty: 90 TABLET | Refills: 1 | Status: SHIPPED | OUTPATIENT
Start: 2020-03-04 | End: 2020-10-12

## 2020-03-04 ASSESSMENT — PATIENT HEALTH QUESTIONNAIRE - PHQ9
1. LITTLE INTEREST OR PLEASURE IN DOING THINGS: 0
SUM OF ALL RESPONSES TO PHQ QUESTIONS 1-9: 0
SUM OF ALL RESPONSES TO PHQ QUESTIONS 1-9: 0
2. FEELING DOWN, DEPRESSED OR HOPELESS: 0
SUM OF ALL RESPONSES TO PHQ9 QUESTIONS 1 & 2: 0

## 2020-05-13 RX ORDER — AMLODIPINE BESYLATE 10 MG/1
TABLET ORAL
Qty: 30 TABLET | Refills: 1 | Status: SHIPPED | OUTPATIENT
Start: 2020-05-13 | End: 2020-07-09

## 2020-06-13 ENCOUNTER — HOSPITAL ENCOUNTER (EMERGENCY)
Age: 40
Discharge: HOME | End: 2020-06-13
Attending: EMERGENCY MEDICINE
Payer: COMMERCIAL

## 2020-06-13 ENCOUNTER — APPOINTMENT (OUTPATIENT)
Dept: GENERAL RADIOLOGY | Age: 40
End: 2020-06-13
Payer: COMMERCIAL

## 2020-06-13 VITALS
SYSTOLIC BLOOD PRESSURE: 154 MMHG | HEART RATE: 81 BPM | DIASTOLIC BLOOD PRESSURE: 72 MMHG | RESPIRATION RATE: 18 BRPM | WEIGHT: 315 LBS | OXYGEN SATURATION: 98 % | BODY MASS INDEX: 39.17 KG/M2 | TEMPERATURE: 98.2 F | HEIGHT: 75 IN

## 2020-06-13 LAB
A/G RATIO: 1.6 (ref 1.1–2.2)
ALBUMIN SERPL-MCNC: 4.3 G/DL (ref 3.4–5)
ALP BLD-CCNC: 49 U/L (ref 40–129)
ALT SERPL-CCNC: 42 U/L (ref 10–40)
ANION GAP SERPL CALCULATED.3IONS-SCNC: 9 MMOL/L (ref 3–16)
ANISOCYTOSIS: ABNORMAL
AST SERPL-CCNC: 36 U/L (ref 15–37)
BASOPHILS ABSOLUTE: 0 K/UL (ref 0–0.2)
BASOPHILS RELATIVE PERCENT: 0.7 %
BILIRUB SERPL-MCNC: 0.5 MG/DL (ref 0–1)
BUN BLDV-MCNC: 18 MG/DL (ref 7–20)
CALCIUM SERPL-MCNC: 8.5 MG/DL (ref 8.3–10.6)
CHLORIDE BLD-SCNC: 103 MMOL/L (ref 99–110)
CO2: 24 MMOL/L (ref 21–32)
CREAT SERPL-MCNC: 1.1 MG/DL (ref 0.9–1.3)
EKG ATRIAL RATE: 89 BPM
EKG DIAGNOSIS: NORMAL
EKG P AXIS: 47 DEGREES
EKG P-R INTERVAL: 160 MS
EKG Q-T INTERVAL: 364 MS
EKG QRS DURATION: 90 MS
EKG QTC CALCULATION (BAZETT): 442 MS
EKG R AXIS: 46 DEGREES
EKG T AXIS: 55 DEGREES
EKG VENTRICULAR RATE: 89 BPM
EOSINOPHILS ABSOLUTE: 0.3 K/UL (ref 0–0.6)
EOSINOPHILS RELATIVE PERCENT: 5.3 %
GFR AFRICAN AMERICAN: >60
GFR NON-AFRICAN AMERICAN: >60
GLOBULIN: 2.7 G/DL
GLUCOSE BLD-MCNC: 108 MG/DL (ref 70–99)
HCT VFR BLD CALC: 42.9 % (ref 40.5–52.5)
HEMATOLOGY PATH CONSULT: YES
HEMOGLOBIN: 13.1 G/DL (ref 13.5–17.5)
LIPASE: 28 U/L (ref 13–60)
LYMPHOCYTES ABSOLUTE: 1.3 K/UL (ref 1–5.1)
LYMPHOCYTES RELATIVE PERCENT: 23.5 %
MCH RBC QN AUTO: 21.2 PG (ref 26–34)
MCHC RBC AUTO-ENTMCNC: 30.6 G/DL (ref 31–36)
MCV RBC AUTO: 69.1 FL (ref 80–100)
MONOCYTES ABSOLUTE: 0.4 K/UL (ref 0–1.3)
MONOCYTES RELATIVE PERCENT: 7.7 %
NEUTROPHILS ABSOLUTE: 3.4 K/UL (ref 1.7–7.7)
NEUTROPHILS RELATIVE PERCENT: 62.8 %
PDW BLD-RTO: 17.1 % (ref 12.4–15.4)
PLATELET # BLD: 328 K/UL (ref 135–450)
PLATELET SLIDE REVIEW: ADEQUATE
PMV BLD AUTO: 7.7 FL (ref 5–10.5)
POTASSIUM REFLEX MAGNESIUM: 4.4 MMOL/L (ref 3.5–5.1)
RBC # BLD: 6.21 M/UL (ref 4.2–5.9)
SLIDE REVIEW: ABNORMAL
SODIUM BLD-SCNC: 136 MMOL/L (ref 136–145)
TOTAL PROTEIN: 7 G/DL (ref 6.4–8.2)
TROPONIN: <0.01 NG/ML
WBC # BLD: 5.5 K/UL (ref 4–11)

## 2020-06-13 PROCEDURE — 84484 ASSAY OF TROPONIN QUANT: CPT

## 2020-06-13 PROCEDURE — 93010 ELECTROCARDIOGRAM REPORT: CPT | Performed by: INTERNAL MEDICINE

## 2020-06-13 PROCEDURE — 99284 EMERGENCY DEPT VISIT MOD MDM: CPT

## 2020-06-13 PROCEDURE — 71046 X-RAY EXAM CHEST 2 VIEWS: CPT

## 2020-06-13 PROCEDURE — 83690 ASSAY OF LIPASE: CPT

## 2020-06-13 PROCEDURE — 80053 COMPREHEN METABOLIC PANEL: CPT

## 2020-06-13 PROCEDURE — 85025 COMPLETE CBC W/AUTO DIFF WBC: CPT

## 2020-06-13 PROCEDURE — 93005 ELECTROCARDIOGRAM TRACING: CPT | Performed by: EMERGENCY MEDICINE

## 2020-06-13 RX ORDER — SUCRALFATE ORAL 1 G/10ML
1 SUSPENSION ORAL 4 TIMES DAILY
Qty: 1200 ML | Refills: 0 | Status: SHIPPED | OUTPATIENT
Start: 2020-06-13 | End: 2020-08-06 | Stop reason: ALTCHOICE

## 2020-06-13 RX ORDER — METOCLOPRAMIDE 10 MG/1
10 TABLET ORAL 4 TIMES DAILY
Qty: 40 TABLET | Refills: 0 | Status: SHIPPED | OUTPATIENT
Start: 2020-06-13 | End: 2020-08-06 | Stop reason: ALTCHOICE

## 2020-06-13 ASSESSMENT — ENCOUNTER SYMPTOMS
NAUSEA: 1
EYE DISCHARGE: 0
DIARRHEA: 0
COUGH: 0
TROUBLE SWALLOWING: 1
ABDOMINAL PAIN: 1
BACK PAIN: 0
SORE THROAT: 0
VOMITING: 0

## 2020-06-13 NOTE — ED TRIAGE NOTES
Chief Complaint   Patient presents with    Abdominal Pain     pt c/o epigastric pain onset 0700, states has hx of barretts esophagus, sent to ED by GI

## 2020-06-13 NOTE — ED PROVIDER NOTES
Negative for confusion. Positives and Pertinent negatives as per HPI. Except as noted above in the ROS, all other systems were reviewed and negative. PAST MEDICAL HISTORY     Past Medical History:   Diagnosis Date    Chicken pox 3/84    Closed fracture of arm age 13    Concussion     Hx of concussions    Concussion     Decorative tattoo     Fall     GERD (gastroesophageal reflux disease)     Hormone replacement therapy     Hypertension     Obese     XAVIER on CPAP     Pericarditis     Pneumonia     Prolonged emergence from general anesthesia     agitation    Umbilical hernia 07/44/8184         SURGICAL HISTORY     Past Surgical History:   Procedure Laterality Date    BRONCHOSCOPY  02/13/2016    CHOLECYSTECTOMY  2007 (?)    HERNIA REPAIR  10/14/2016               Umbilical Hernia Repair    HERNIA REPAIR  14/03/6352    umbilical    TONSILLECTOMY AND ADENOIDECTOMY      TYMPANOSTOMY TUBE PLACEMENT      bilaterally x 1    WISDOM TOOTH EXTRACTION      all 4         CURRENTMEDICATIONS       Discharge Medication List as of 6/13/2020 12:42 PM      CONTINUE these medications which have NOT CHANGED    Details   amLODIPine (NORVASC) 10 MG tablet TAKE ONE TABLET BY MOUTH DAILY, Disp-30 tablet, R-1Normal      lisinopril (PRINIVIL;ZESTRIL) 5 MG tablet TAKE ONE TABLET BY MOUTH DAILY, Disp-90 tablet, R-1Normal      pantoprazole (PROTONIX) 40 MG tablet Take 40 mg by mouth daily Historical Med      albuterol sulfate  (90 Base) MCG/ACT inhaler Inhale 2 puffs into the lungs every 4 hours as needed for Wheezing With spacer chamber, Disp-1 Inhaler, R-0Print      Multiple Vitamin (MULTIVITAMIN PO) Take  by mouth.                  ALLERGIES     Prednisone and Rocephin [ceftriaxone]    FAMILYHISTORY       Family History   Problem Relation Age of Onset    Other Father         kidney stones    Cancer Father         bladder cancer, ? second hand smoke    Other Mother         bipolar disorder    Bipolar Disorder Mother     Depression Paternal Grandmother     Bipolar Disorder Other     Heart Disease Maternal Aunt     Cancer Maternal Grandfather         Colon          SOCIAL HISTORY       Social History     Socioeconomic History    Marital status:      Spouse name: None    Number of children: None    Years of education: None    Highest education level: None   Occupational History    None   Social Needs    Financial resource strain: None    Food insecurity     Worry: None     Inability: None    Transportation needs     Medical: None     Non-medical: None   Tobacco Use    Smoking status: Former Smoker     Types: Cigars     Last attempt to quit: 2016     Years since quittin.1    Smokeless tobacco: Never Used   Substance and Sexual Activity    Alcohol use: Not Currently     Alcohol/week: 0.0 standard drinks     Comment: socially-rarely    Drug use: Yes     Frequency: 1.0 times per week     Types: Marijuana    Sexual activity: None   Lifestyle    Physical activity     Days per week: None     Minutes per session: None    Stress: None   Relationships    Social connections     Talks on phone: None     Gets together: None     Attends Congregation service: None     Active member of club or organization: None     Attends meetings of clubs or organizations: None     Relationship status: None    Intimate partner violence     Fear of current or ex partner: None     Emotionally abused: None     Physically abused: None     Forced sexual activity: None   Other Topics Concern    None   Social History Narrative    None       SCREENINGS    [place] Coma Scale  Eye Opening: Spontaneous  Best Verbal Response: Oriented  Best Motor Response: Obeys commands  Hector Coma Scale Score: 15        PHYSICAL EXAM    (up to 7 for level 4, 8 or more for level 5)     ED Triage Vitals [20 1036]   BP Temp Temp Source Pulse Resp SpO2 Height Weight   (!) 165/75 98.2 °F (36.8 °C) Oral 94 16 100 % 6' 3\" (1.905 m) (!) normal  Ectopy: none  Conduction: normal  ST Segments: nonspecific changes  T Waves: normal  Q Waves: none    Clinical Impression: normal sinus rhythm, non-specific st changes      RADIOLOGY:   Non-plain film images such as CT, Ultrasound and MRI are read by the radiologist.  Plain radiographic images are visualized and preliminarily interpreted by the ED Provider with the belowfindings:    Interpretation per the Radiologist below, if available at the time of this note:    XR CHEST STANDARD (2 VW)   Final Result   No active cardiopulmonary disease               PROCEDURES   Unless otherwise noted below, none     Procedures    CRITICAL CARE TIME   N/A    CONSULTS:  None      EMERGENCY DEPARTMENT COURSE and DIFFERENTIAL DIAGNOSIS/MDM:   Vitals:    Vitals:    06/13/20 1036 06/13/20 1134 06/13/20 1230 06/13/20 1232   BP: (!) 165/75 (!) 154/69  (!) 154/72   Pulse: 94  85 81   Resp: 16  18 18   Temp: 98.2 °F (36.8 °C)      TempSrc: Oral      SpO2: 100% 98% 99% 98%   Weight: (!) 335 lb (152 kg)      Height: 6' 3\" (1.905 m)          Patient was given the following medications:  Medications - No data to display    Patient's symptoms he states that his symptoms are completely consistent with previous reflux exacerbations. Cardiac work-up was initiated on the patient and he has some nonspecific changes on his EKG but otherwise unremarkable. He feels better after medications. At this point he would like to follow-up with GI. I am changing his medications around some to hopefully get better control over his symptoms. Patient is agreeable with this plan. I estimate there is LOW risk for ACUTE APPENDICITIS, BOWEL OBSTRUCTION, CHOLECYSTITIS, acute coronary syndrome, PE, DIVERTICULITIS, INCARCERATED HERNIA, PANCREATITIS, or PERFORATED BOWEL or ULCER, thus I consider the discharge disposition reasonable. Also, there is no evidence or peritonitis, sepsis, or toxicity.  Petros Velazquez and I have discussed the diagnosis and

## 2020-06-15 LAB — HEMATOLOGY PATH CONSULT: NORMAL

## 2020-07-06 ENCOUNTER — HOSPITAL ENCOUNTER (OUTPATIENT)
Age: 40
Discharge: HOME | End: 2020-07-06
Payer: COMMERCIAL

## 2020-07-06 LAB
AMPHETAMINE SCREEN, URINE: ABNORMAL
BARBITURATE SCREEN URINE: ABNORMAL
BENZODIAZEPINE SCREEN, URINE: ABNORMAL
CANNABINOID SCREEN URINE: POSITIVE
COCAINE METABOLITE SCREEN URINE: ABNORMAL
Lab: ABNORMAL
METHADONE SCREEN, URINE: ABNORMAL
OPIATE SCREEN URINE: ABNORMAL
OXYCODONE URINE: ABNORMAL
PH UA: 8
PHENCYCLIDINE SCREEN URINE: ABNORMAL
PROPOXYPHENE SCREEN: ABNORMAL

## 2020-07-06 PROCEDURE — 80307 DRUG TEST PRSMV CHEM ANLYZR: CPT

## 2020-07-09 RX ORDER — AMLODIPINE BESYLATE 10 MG/1
TABLET ORAL
Qty: 30 TABLET | Refills: 0 | Status: SHIPPED | OUTPATIENT
Start: 2020-07-09 | End: 2020-08-07

## 2020-07-09 NOTE — TELEPHONE ENCOUNTER
Refill Request amLODIPine BESYLATE 10 MG TAB    Last Seen: 3/4/2020    Last Written: 5/13/20 30 tablets with 1 refill    Next Appointment:   Future Appointments   Date Time Provider Abelino Mejia   7/15/2020  9:00 AM [facility] Prescriptions     Pending Prescriptions Disp Refills    amLODIPine (NORVASC) 10 MG tablet [Pharmacy Med Name: amLODIPine BESYLATE 10 MG TAB] 30 tablet 0     Sig: TAKE ONE TABLET BY MOUTH DAILY

## 2020-07-15 ENCOUNTER — HOSPITAL ENCOUNTER (OUTPATIENT)
Dept: GENERAL RADIOLOGY | Age: 40
Discharge: HOME | End: 2020-07-15
Payer: COMMERCIAL

## 2020-07-15 PROCEDURE — 74220 X-RAY XM ESOPHAGUS 1CNTRST: CPT

## 2020-07-29 ENCOUNTER — HOSPITAL ENCOUNTER (OUTPATIENT)
Age: 40
Discharge: HOME | End: 2020-07-29
Payer: COMMERCIAL

## 2020-07-29 ENCOUNTER — HOSPITAL ENCOUNTER (OUTPATIENT)
Dept: CT IMAGING | Age: 40
Discharge: HOME | End: 2020-07-29
Payer: COMMERCIAL

## 2020-07-29 LAB
BUN BLDV-MCNC: 19 MG/DL (ref 7–20)
CREAT SERPL-MCNC: 1 MG/DL (ref 0.9–1.3)
GFR AFRICAN AMERICAN: >60
GFR NON-AFRICAN AMERICAN: >60

## 2020-07-29 PROCEDURE — 74177 CT ABD & PELVIS W/CONTRAST: CPT

## 2020-07-29 PROCEDURE — 84520 ASSAY OF UREA NITROGEN: CPT

## 2020-07-29 PROCEDURE — 82565 ASSAY OF CREATININE: CPT

## 2020-07-29 PROCEDURE — 6360000004 HC RX CONTRAST MEDICATION: Performed by: INTERNAL MEDICINE

## 2020-07-29 PROCEDURE — 36415 COLL VENOUS BLD VENIPUNCTURE: CPT

## 2020-07-29 RX ADMIN — IOPAMIDOL 100 ML: 755 INJECTION, SOLUTION INTRAVENOUS at 09:07

## 2020-07-29 RX ADMIN — IOHEXOL 50 ML: 240 INJECTION, SOLUTION INTRATHECAL; INTRAVASCULAR; INTRAVENOUS; ORAL at 09:07

## 2020-08-03 ENCOUNTER — OFFICE VISIT (OUTPATIENT)
Dept: FAMILY MEDICINE CLINIC | Age: 40
End: 2020-08-03
Payer: COMMERCIAL

## 2020-08-03 VITALS
BODY MASS INDEX: 39.17 KG/M2 | HEIGHT: 75 IN | SYSTOLIC BLOOD PRESSURE: 136 MMHG | HEART RATE: 117 BPM | WEIGHT: 315 LBS | OXYGEN SATURATION: 98 % | TEMPERATURE: 98.7 F | DIASTOLIC BLOOD PRESSURE: 80 MMHG

## 2020-08-03 PROCEDURE — 99213 OFFICE O/P EST LOW 20 MIN: CPT | Performed by: PHYSICIAN ASSISTANT

## 2020-08-03 PROCEDURE — 1036F TOBACCO NON-USER: CPT | Performed by: PHYSICIAN ASSISTANT

## 2020-08-03 PROCEDURE — G8427 DOCREV CUR MEDS BY ELIG CLIN: HCPCS | Performed by: PHYSICIAN ASSISTANT

## 2020-08-03 PROCEDURE — 36415 COLL VENOUS BLD VENIPUNCTURE: CPT | Performed by: PHYSICIAN ASSISTANT

## 2020-08-03 PROCEDURE — G8417 CALC BMI ABV UP PARAM F/U: HCPCS | Performed by: PHYSICIAN ASSISTANT

## 2020-08-03 ASSESSMENT — ENCOUNTER SYMPTOMS
RESPIRATORY NEGATIVE: 1
ABDOMINAL PAIN: 1

## 2020-08-03 NOTE — PROGRESS NOTES
Subjective:      Patient ID: Dom Conley is a 47 y.o. male. HPI  Patient presents today for follow up of abnormal CT ordered by GI. Noted to have increased inguinal lymphadenopathy. He reports over the last few months decreased appetite, feels full all the time. Has had increased fatigue and inability to lift weight like he has in the past. Has lost weight but has also changed his diet to a  diet. Review of Systems   Constitutional: Positive for activity change, appetite change and fatigue. Respiratory: Negative. Cardiovascular: Negative. Gastrointestinal: Positive for abdominal pain. Genitourinary: Negative. Neurological: Negative. Objective:   Physical Exam  Constitutional:       Appearance: Normal appearance. He is obese. Cardiovascular:      Rate and Rhythm: Regular rhythm. Heart sounds: Normal heart sounds. Pulmonary:      Effort: Pulmonary effort is normal.      Breath sounds: Normal breath sounds. Lymphadenopathy:      Lower Body: Right inguinal adenopathy present. Left inguinal adenopathy present. Neurological:      Mental Status: He is alert. Assessment:       Diagnosis Orders   1. Anemia, unspecified type  CBC WITH AUTO DIFFERENTIAL    Iron and TIBC    RETICULOCYTES    Ferritin   2. Inguinal lymphadenopathy  SEDIMENTATION RATE    C-REACTIVE PROTEIN    CK    LACTATE DEHYDROGENASE    Bing Martinez MD, General Surgery, [facility]           Plan:      See orders. Will refer to general surgery for biopsy.          NAKUL Up

## 2020-08-04 LAB
BASOPHILS ABSOLUTE: 0.1 K/UL (ref 0–0.2)
BASOPHILS RELATIVE PERCENT: 0.9 %
C-REACTIVE PROTEIN: 3.1 MG/L (ref 0–5.1)
EOSINOPHILS ABSOLUTE: 0.3 K/UL (ref 0–0.6)
EOSINOPHILS RELATIVE PERCENT: 3.3 %
FERRITIN: 11.4 NG/ML (ref 30–400)
HCT VFR BLD CALC: 45 % (ref 40.5–52.5)
HEMATOLOGY PATH CONSULT: NO
HEMOGLOBIN: 13.8 G/DL (ref 13.5–17.5)
IMMATURE RETIC FRACT: 0.44 (ref 0.21–0.37)
IRON SATURATION: 10 % (ref 20–50)
IRON: 41 UG/DL (ref 59–158)
LACTATE DEHYDROGENASE: 181 U/L (ref 100–190)
LYMPHOCYTES ABSOLUTE: 1.9 K/UL (ref 1–5.1)
LYMPHOCYTES RELATIVE PERCENT: 23.5 %
MCH RBC QN AUTO: 21.4 PG (ref 26–34)
MCHC RBC AUTO-ENTMCNC: 30.7 G/DL (ref 31–36)
MCV RBC AUTO: 69.6 FL (ref 80–100)
MONOCYTES ABSOLUTE: 0.7 K/UL (ref 0–1.3)
MONOCYTES RELATIVE PERCENT: 8.3 %
NEUTROPHILS ABSOLUTE: 5.2 K/UL (ref 1.7–7.7)
NEUTROPHILS RELATIVE PERCENT: 64 %
PDW BLD-RTO: 17.9 % (ref 12.4–15.4)
PLATELET # BLD: 312 K/UL (ref 135–450)
PMV BLD AUTO: 7.8 FL (ref 5–10.5)
RBC # BLD: 6.47 M/UL (ref 4.2–5.9)
RETICULOCYTE ABSOLUTE COUNT: 0.1 M/UL
RETICULOCYTE COUNT PCT: 1.52 % (ref 0.5–2.18)
SEDIMENTATION RATE, ERYTHROCYTE: 4 MM/HR (ref 0–15)
TOTAL CK: 672 U/L (ref 39–308)
TOTAL IRON BINDING CAPACITY: 393 UG/DL (ref 260–445)
WBC # BLD: 8.1 K/UL (ref 4–11)

## 2020-08-04 RX ORDER — LANOLIN ALCOHOL/MO/W.PET/CERES
325 CREAM (GRAM) TOPICAL
Qty: 90 TABLET | Refills: 0 | Status: SHIPPED | OUTPATIENT
Start: 2020-08-04

## 2020-08-05 LAB
ALBUMIN SERPL-MCNC: 3.4 G/DL (ref 3.1–4.9)
ALPHA-1-GLOBULIN: 0.3 G/DL (ref 0.2–0.4)
ALPHA-2-GLOBULIN: 0.8 G/DL (ref 0.4–1.1)
BETA GLOBULIN: 1.3 G/DL (ref 0.9–1.6)
GAMMA GLOBULIN: 1.6 G/DL (ref 0.6–1.8)
SPE/IFE INTERPRETATION: NORMAL
TOTAL PROTEIN: 7.4 G/DL (ref 6.4–8.2)

## 2020-08-06 ENCOUNTER — OFFICE VISIT (OUTPATIENT)
Dept: PRIMARY CARE CLINIC | Age: 40
End: 2020-08-06
Payer: COMMERCIAL

## 2020-08-06 ENCOUNTER — INITIAL CONSULT (OUTPATIENT)
Dept: SURGERY | Age: 40
End: 2020-08-06
Payer: COMMERCIAL

## 2020-08-06 ENCOUNTER — OFFICE VISIT (OUTPATIENT)
Dept: FAMILY MEDICINE CLINIC | Age: 40
End: 2020-08-06
Payer: COMMERCIAL

## 2020-08-06 ENCOUNTER — TELEPHONE (OUTPATIENT)
Dept: SURGERY | Age: 40
End: 2020-08-06

## 2020-08-06 VITALS
BODY MASS INDEX: 39.17 KG/M2 | SYSTOLIC BLOOD PRESSURE: 140 MMHG | HEIGHT: 75 IN | HEART RATE: 81 BPM | DIASTOLIC BLOOD PRESSURE: 87 MMHG | TEMPERATURE: 99.1 F | WEIGHT: 315 LBS

## 2020-08-06 VITALS
SYSTOLIC BLOOD PRESSURE: 138 MMHG | TEMPERATURE: 97.7 F | OXYGEN SATURATION: 97 % | WEIGHT: 315 LBS | HEIGHT: 75 IN | DIASTOLIC BLOOD PRESSURE: 86 MMHG | HEART RATE: 94 BPM | BODY MASS INDEX: 39.17 KG/M2

## 2020-08-06 PROCEDURE — 99213 OFFICE O/P EST LOW 20 MIN: CPT | Performed by: PHYSICIAN ASSISTANT

## 2020-08-06 PROCEDURE — G8417 CALC BMI ABV UP PARAM F/U: HCPCS | Performed by: NURSE PRACTITIONER

## 2020-08-06 PROCEDURE — 99244 OFF/OP CNSLTJ NEW/EST MOD 40: CPT | Performed by: SURGERY

## 2020-08-06 PROCEDURE — 1036F TOBACCO NON-USER: CPT | Performed by: PHYSICIAN ASSISTANT

## 2020-08-06 PROCEDURE — G8428 CUR MEDS NOT DOCUMENT: HCPCS | Performed by: NURSE PRACTITIONER

## 2020-08-06 PROCEDURE — G8417 CALC BMI ABV UP PARAM F/U: HCPCS | Performed by: SURGERY

## 2020-08-06 PROCEDURE — G8417 CALC BMI ABV UP PARAM F/U: HCPCS | Performed by: PHYSICIAN ASSISTANT

## 2020-08-06 PROCEDURE — 99211 OFF/OP EST MAY X REQ PHY/QHP: CPT | Performed by: NURSE PRACTITIONER

## 2020-08-06 PROCEDURE — G8427 DOCREV CUR MEDS BY ELIG CLIN: HCPCS | Performed by: SURGERY

## 2020-08-06 PROCEDURE — G8427 DOCREV CUR MEDS BY ELIG CLIN: HCPCS | Performed by: PHYSICIAN ASSISTANT

## 2020-08-06 NOTE — LETTER
Surgery Scheduling Form:  DEMOGRAPHICS:                                                                                                         .  Patient Name:  Val Oscar  Patient :  1980   Patient SS#:      Patient Phone:  [phone] (home)                         Alt. Patient Phone:                 Patient Address:  [address] [place] [address]   [address] [place] [id number]    PCP:  NAKUL Becerra  Insurance:  Payor: [employer] / Plan: Miranavin Salinas / Product Type: *No Product type* /        Insurance ID Number:    Payor/Plan Subscr  Sex Relation Sub. Ins. ID Effective Group Num   1. RYAN - * SUE DIETZ 1980 Male Self [id number] [facility]                                   [address]     Interpretor Needed:  (NO)  (TYPE)           LATEX ALLERGY:  (NO)  Allergies: Prednisone and Rocephin   Defibulator or Pacemaker:  (NO)    DIAGNOSIS & PROCEDURE:                                                                                       .  Diagnosis Code/Description: Inguinal lymphadenopathy R59.0   Operation Code/Description:  Biopsy of inguinal lymph node 93123  Location:  [facility]              Surgeon:  Dr. Wellington Givens    SCHEDULING INFORMATION:                                                                                    .  Surgeon's Scheduling Instruction:  elective  Requested Date: 20     OR Time: 115 pm            Patient Arrival Time: 1115 am  OR Time Required:  60  Minutes  Anesthesia: MAC and Local      Equipment: n/a                                                        SA Required (only for Mac and Gen): yes  Status:  Outpatient        Standard C-Arm (only for port and sofía):  n/a   Mini C-Arm: No   PAT Required: Yes                                          Best Time to Call: Anytime  Pt.  Requested to see PCP for Pre-op H & P:  Yes  Cardiac Clearance Requested:  (NO) PRE-CERTIFICATION INFORMATION:                                                                           .  Procedure/CPT code: Biopsy of inguinal lymph node 97060      Modifier:

## 2020-08-06 NOTE — PATIENT INSTRUCTIONS

## 2020-08-06 NOTE — PROGRESS NOTES
[address]   O: [phone]   F: [phone]    PRE- OPERATIVE HISTORY AND PHYSICAL    Today's Date: 08/06/20    Chief Complaint   Patient presents with   Deonna Bravo 8-11-20 biopsy of ingunial lymph node fax [phone]       HISTORY OF PRESENT ILLNESS:       Blake Groves 1980 is a 47 y.o. male who presents for pre-operative evaluation for:     PROCEDURE:  Lymph node biopsy/excision     INDICATION FOR PROCEDURE:  Enlarged left groin nodes    DATE OF PROCEDURE: 8-    PHYSICIAN performing PROCEDURE:   Dr. Aubrie Vicente anesthesia is:  general  History of adverse or allergic reaction to anesthesia?   No    Dentures: none  Bleeding risk?:   no recent or remote history of abnormal bleeding  Previous transfusion/Complications: no      REVIEW OF SYSTEMS:    CONSTITUTIONAL:  negative  EYES:  negative  HEENT:  negative  RESPIRATORY:  negative  CARDIOVASCULAR:  negative  GASTROINTESTINAL:  negative  GENITOURINARY:  negative  INTEGUMENT/BREAST:  negative  HEMATOLOGIC/LYMPHATIC:  negative  ALLERGIC/IMMUNOLOGIC:  negative  ENDOCRINE:  negative  MUSCULOSKELETAL:  Negative    NEUROLOGICAL:  negative    PAST MEDICAL HISTORY:  Past Medical History:   Diagnosis Date    Chicken pox 3/84    Closed fracture of arm age 7    Concussion     Hx of concussions    Concussion     Decorative tattoo     Fall     GERD (gastroesophageal reflux disease)     Hormone replacement therapy     Hypertension     Obese     XAVIER on CPAP     Pericarditis     Pneumonia     Prolonged emergence from general anesthesia     agitation    Umbilical hernia 87/13/5838       PROBLEM LIST:     Patient Active Problem List   Diagnosis    Obese    GERD (gastroesophageal reflux disease)    ADD (attention deficit disorder)    Pericarditis    HTN (hypertension)    Chest pain    SOB (shortness of breath)    Abnormal echocardiogram    Obesity, pantoprazole (PROTONIX) 40 MG tablet Take 40 mg by mouth daily       Multiple Vitamin (MULTIVITAMIN PO) Take  by mouth. No current facility-administered medications for this visit. ALLERGIES:    Allergies   Allergen Reactions    Prednisone Hives     itchy    Rocephin [Ceftriaxone] Itching       PHYSICAL EXAM:    Vitals:    08/06/20 1439   BP: 138/86   Site: Right Upper Arm   Position: Sitting   Cuff Size: Medium Adult   Pulse: 94   Temp: 97.7 °F (36.5 °C)   TempSrc: Temporal   SpO2: 97%   Weight: (!) 358 lb 3.2 oz (162.5 kg)   Height: 6' 3\" (1.905 m)   Body mass index is 44.77 kg/m². Constitutional:  Awake, alert, cooperative, no apparent distress. Eyes:  Lids and lashes normal, PERRLA, EOMI, sclera clear, conjunctiva normal  Head/ENT:  Normocephalic, atraumatic, sinuses nontender on palpation, TMs intact, ear canals clear, oral pharynx with moist mucus membranes, tonsils without erythema or exudates, gums normal and good dentition. Neck:  Supple, no adenopathy, thyroid symmetric. Heart:  Regular rate and rhythm, normal S1 and S2, no S3 or S4, and no murmur noted  Lungs:  Clear to auscultation, no crackles or wheezing  Abdomen:  No scars, normal bowel sounds, soft, non-distended, non-tender, no masses palpated, no hepatosplenomegally  Extremities:  No clubbing, cyanosis, or edema. Moves all joints. Neurologic:  Awake, alert, oriented to name, place and time. Cranial nerves II-XII are grossly intact. Motor and sensory equal and intact bilateral.       ADDITIONAL DATA:  LABWORK:  No orders of the defined types were placed in this encounter. ASSESSMENT and PLAN:    Pre-Operative Medical Clearance Examination for  Inguinal lymph node biopsy    Miguel Ángel Granger 1980 47 y.o. male is medically satisfactory for surgery. Rebeca Yañez was advised to STOP all  NSAID medications including, but not limited to, aspirin, ibuprofen, and naprosyn 7-10 days prior to procedure. [address] [place], [place] 08/06/20   3:02 PM

## 2020-08-06 NOTE — PROGRESS NOTES
Martha Cobb received a viral test for COVID-19. They were educated on isolation and quarantine as appropriate. For any symptoms, they were directed to seek care from their PCP, given contact information to establish with a doctor, directed to an urgent care or the emergency room.

## 2020-08-06 NOTE — TELEPHONE ENCOUNTER
Called patient, scheduled for biopsy inguinal lymph node on 8/11/20 at 115 pm with an arrival of 1115 am. Informed the patient that PAT will also call with further instructions. Patient verbally states that he/she understands pre-op instructions. Patient notified of pre-op instructions:    *NPO after midnight     *H&P prior to surgery- Viktoriya MOTA    *Cardiac clearance, if needed. - n/a    *Stop all blood thinners, if needed. - n/a    *Will need a     *Call the office with any further questions.      *COVID test

## 2020-08-07 NOTE — PROGRESS NOTES
Preoperative Screening for Elective Surgery/Invasive Procedures While COVID-19 present in the community     Have you tested positive or have been told to self-isolate for COVID-19 like symptoms within the past 28 days?  Do you currently have any of the following symptoms? o Fever >100.0 F or 99.9 F in immunocompromised patients? o New onset cough, shortness of breath or difficulty breathing?  o New onset sore throat, myalgia (muscle aches and pains), headache, loss of taste/smell or diarrhea?  Have you had a potential exposure to COVID-19 within the past 14 days by:  o Close contact with a confirmed case? o Close contact with a healthcare worker,  or essential infrastructure worker (grocery store, [employer], gas station, public utilities or transportation)? o Do you reside in a congregate setting such as; skilled nursing facility, adult home, correctional facility, homeless shelter or other institutional setting?  o Have you had recent travel to a known COVID-19 hotspot? Indicate if the patient has a positive screen by answering yes to one or more of the above questions. Patients who test positive or screen positive prior to surgery or on the day of surgery should be evaluated in conjunction with the surgeon/proceduralist/anesthesiologist to determine the urgency of the procedure.    Patient answered no to all above covid questions

## 2020-08-07 NOTE — PROGRESS NOTES
Department of General Surgery Consult    PATIENT NAME: Kalyani Lamas OF BIRTH: 1980    ADMISSION DATE: No admission date for patient encounter. TODAY'S DATE: 8/6/20    Reason for Consult:  Inguinal lymphadenopathy    Chief Complaint: fatigue    Requesting Physician:  Slava Grande    HISTORY OF PRESENT ILLNESS:              The patient is a 47 y.o. male who presents with fatigue reports fatigue over the past year. Some weight loss but has been trying to lose weight. No night sweats, fevers or chills. No abdominal pain or nausea. No diarrhea. No r\ecent skin infections. Past Medical History:        Diagnosis Date    Chicken pox 3/84    Closed fracture of arm age 13    Concussion     Hx of concussions    Concussion     Decorative tattoo     Fall     GERD (gastroesophageal reflux disease)     Hormone replacement therapy     Hypertension     Obese     XAVIER treated with BiPAP     Pericarditis     Pneumonia     Prolonged emergence from general anesthesia     agitation- hx of    TBI (traumatic brain injury) ([facility] [address]     hx of    Umbilical hernia 35/52/6631       Past Surgical History:        Procedure Laterality Date    BRONCHOSCOPY  02/13/2016    CHOLECYSTECTOMY  2007 (?)    HERNIA REPAIR  10/14/2016               Umbilical Hernia Repair    HERNIA REPAIR  86/20/2100    umbilical    TONSILLECTOMY AND ADENOIDECTOMY      TYMPANOSTOMY TUBE PLACEMENT      bilaterally x 1    WISDOM TOOTH EXTRACTION      all 4       Current Medications:   No current facility-administered medications for this visit. Prior to Admission medications    Medication Sig Start Date End Date Taking?  Authorizing Provider   ferrous sulfate (FE TABS) 325 (65 Fe) MG EC tablet Take 1 tablet by mouth daily (with breakfast) 8/4/20  Yes NAKUL Lambert   lisinopril (PRINIVIL;ZESTRIL) 5 MG tablet TAKE ONE TABLET BY MOUTH DAILY  Patient taking differently: Take 5 mg by mouth nightly TAKE ONE TABLET BY MOUTH DAILY 3/4/20  Yes NAKUL Hernandez   pantoprazole (PROTONIX) 40 MG tablet Take 40 mg by mouth 2 times daily  4/26/19  Yes Historical Provider, MD   Multiple Vitamin (MULTIVITAMIN PO) Take  by mouth. Yes Historical Provider, MD   amLODIPine (NORVASC) 10 MG tablet Take 1 tablet by mouth daily TAKE ONE TABLET BY MOUTH DAILY  Patient taking differently: Take 10 mg by mouth nightly TAKE ONE TABLET BY MOUTH DAILY 8/7/20   NAKUL Holm   TESTOSTERONE AQUEOUS IM Inject into the skin three times a week Obtains off the internet    Historical Provider, MD        Allergies:  Prednisone and Rocephin [ceftriaxone]    Social History:   TOBACCO:  quit  ETOH:  no    Family History:        Problem Relation Age of Onset    Other Father         kidney stones    Cancer Father         bladder cancer, ? second hand smoke    Other Mother         bipolar disorder    Bipolar Disorder Mother     Depression Paternal Grandmother     Bipolar Disorder Other     Heart Disease Maternal Aunt     Cancer Maternal Grandfather         Colon       REVIEW OF SYSTEMS:  CONSTITUTIONAL:  negative  HEENT:  negative  RESPIRATORY:  negative  CARDIOVASCULAR:  negative  GASTROINTESTINAL:  negative  GENITOURINARY:  negative  HEMATOLOGIC/LYMPHATIC:  negative  NEUROLOGICAL:  Negative  * All other ROS reviewed and negative.        PHYSICAL EXAM:  VITALS:  BP (!) 140/87   Pulse 81   Temp 99.1 °F (37.3 °C)   Ht 6' 3\" (1.905 m)   Wt (!) 350 lb (158.8 kg)   BMI 43.75 kg/m²   24HR INTAKE/OUTPUT:    [unfilled]  [unfilled]    CONSTITUTIONAL:  alert, no apparent distress and morbidly obese  EYES:  PERRL, sclera clear  ENT:  Normocephalic,atraumatic, without obvious abnormality  NECK:  supple, symmetrical, trachea midline  LUNGS: Resp effort easy and unlabored, no crackles or wheezing  CARDIOVASCULAR:  NO JVD, regular rate and rhythm and no murmur noted  ABDOMEN:  , normal bowel sounds, soft, non-distended, non-tender, voluntary guarding absent, LYMPH:  Bilateral inguinal adenopathy, no axillary or cervical adenopathy  MUSCULOSKELETAL: No clubbing or cyanosis, 0+ pitting edema lower extremities  NEUROLOGIC:  Mental Status Exam:  Level of Alertness:   awake  PSYCHIATRIC:   person, place, time  SKIN:  no rashes    DATA:    CBC: No results for input(s): WBC, HGB, HCT, PLT in the last 72 hours. BMP:  No results for input(s): NA, K, CL, CO2, BUN, CREATININE, GLUCOSE in the last 72 hours. Hepatic: No results for input(s): AST, ALT, ALB, BILITOT, ALKPHOS in the last 72 hours. Mag:    No results for input(s): MG in the last 72 hours. Phos:   No results for input(s): PHOS in the last 72 hours. INR: No results for input(s): INR in the last 72 hours. Radiology Review: Images personally reviewed by me. CT -   Increased adenopathy in the inguinal region and pelvic sidewall bilaterally    is nonspecific.  The findings likely related to reactive changes however    correlation with any known malignancy or lymphoproliferative disorder is    recommended.         Mild increased stranding within the subcutaneous fat with mildly prominent    collateral vessels noted within the soft tissues.  Please correlate with any    history of known thrombosis or infection.  Duplex Doppler study could always    be considered to further evaluate for thrombosis if clinically indicated. IMPRESSION/RECOMMENDATIONS:    48 yo with bilateral inguinal adenopathy and fatigue  1. No recent lower extremity infections. 2.  Adenopathy present on CT since 2013, but increased currently. 3.  Discussed options of lymph node biopsy vs continuing to monitor but because of fatigue would like to know if this is related to cause. Will plan for biopsy soon. Risks of operation and postop restrictions reviewed.     Electronically signed by Soraida Jones, [address] [place] [address]  [phone]

## 2020-08-09 ENCOUNTER — ANESTHESIA EVENT (OUTPATIENT)
Dept: OPERATING ROOM | Age: 40
End: 2020-08-09
Payer: COMMERCIAL

## 2020-08-09 LAB — SARS-COV-2, NAA: NOT DETECTED

## 2020-08-10 NOTE — ANESTHESIA PRE PROCEDURE
encephalopathy)    Abnormal CXR    Mild concentric left ventricular hypertrophy (LVH)    Pulmonary HTN (HCC)    Intractable migraine with aura without status migrainosus    New persistent daily headache    Hypogonadism male    Other fatigue    Cervical disc disorder of mid-cervical region    Chronic bilateral low back pain without sciatica     Past Medical History:     Chicken pox 3/84    Closed fracture of arm age 7    Concussion     Hx of concussions    Concussion     Decorative tattoo     Fall     GERD (gastroesophageal reflux disease)     Hormone replacement therapy     Hypertension     Obese     XAVIER treated with BiPAP     Pericarditis     Pneumonia     Prolonged emergence from general anesthesia     agitation- hx of    TBI (traumatic brain injury) (HCC)     hx of    Umbilical hernia      Past Surgical History:     BRONCHOSCOPY  2016    CHOLECYSTECTOMY   (?)    HERNIA REPAIR  10/14/2016               Umbilical Hernia Repair    HERNIA REPAIR  10/79/7272    umbilical    TONSILLECTOMY AND ADENOIDECTOMY      TYMPANOSTOMY TUBE PLACEMENT      bilaterally x 1    WISDOM TOOTH EXTRACTION      all 4     Social History:     Smoking status: Former Smoker     Types: Cigars     Last attempt to quit: 2016     Years since quittin.2    Smokeless tobacco: Never Used   Substance Use Topics    Alcohol use: Not Currently     Alcohol/week: 0.0 standard drinks     Comment: socially-rarely     Vital Signs (Current):    BP: 139/107  Pulse: 87    Resp: 18  SpO2: 98    Temp: 96.8 °F (36 °C)    Height: 6' 3\" (1.905 m)  (20)  Weight: 356 lb (161.5 kg)  (20)    BMI: 44.6            BP Readings from Last 3 Encounters:   20 138/86   20 (!) 140/87   20 136/80     NPO Status: >8 hrs                        BMI:   Wt Readings from Last 3 Encounters:   20 (!) 358 lb 3.2 oz (162.5 kg)   20 (!) 350 lb (158.8 kg)   20 (!) 355 lb (161 kg) Lizzie Kwong MD

## 2020-08-11 ENCOUNTER — HOSPITAL ENCOUNTER (OUTPATIENT)
Age: 40
Setting detail: OUTPATIENT SURGERY
Discharge: HOSPICE | End: 2020-08-11
Attending: SURGERY | Admitting: SURGERY
Payer: COMMERCIAL

## 2020-08-11 ENCOUNTER — ANESTHESIA (OUTPATIENT)
Dept: OPERATING ROOM | Age: 40
End: 2020-08-11
Payer: COMMERCIAL

## 2020-08-11 VITALS
DIASTOLIC BLOOD PRESSURE: 24 MMHG | SYSTOLIC BLOOD PRESSURE: 107 MMHG | WEIGHT: 315 LBS | RESPIRATION RATE: 17 BRPM | OXYGEN SATURATION: 96 % | BODY MASS INDEX: 39.17 KG/M2 | HEIGHT: 75 IN | TEMPERATURE: 96.8 F | HEART RATE: 79 BPM

## 2020-08-11 VITALS — DIASTOLIC BLOOD PRESSURE: 61 MMHG | OXYGEN SATURATION: 98 % | SYSTOLIC BLOOD PRESSURE: 111 MMHG

## 2020-08-11 LAB
ANION GAP SERPL CALCULATED.3IONS-SCNC: 9 MMOL/L (ref 3–16)
BUN BLDV-MCNC: 17 MG/DL (ref 7–20)
CALCIUM SERPL-MCNC: 9.2 MG/DL (ref 8.3–10.6)
CHLORIDE BLD-SCNC: 101 MMOL/L (ref 99–110)
CO2: 25 MMOL/L (ref 21–32)
CREAT SERPL-MCNC: 1.2 MG/DL (ref 0.9–1.3)
GFR AFRICAN AMERICAN: >60
GFR NON-AFRICAN AMERICAN: >60
GLUCOSE BLD-MCNC: 126 MG/DL (ref 70–99)
POTASSIUM REFLEX MAGNESIUM: 4.6 MMOL/L (ref 3.5–5.1)
SODIUM BLD-SCNC: 135 MMOL/L (ref 136–145)

## 2020-08-11 PROCEDURE — 88331 PATH CONSLTJ SURG 1 BLK 1SPC: CPT

## 2020-08-11 PROCEDURE — 2709999900 HC NON-CHARGEABLE SUPPLY: Performed by: SURGERY

## 2020-08-11 PROCEDURE — 2720000010 HC SURG SUPPLY STERILE: Performed by: SURGERY

## 2020-08-11 PROCEDURE — 7100000010 HC PHASE II RECOVERY - FIRST 15 MIN: Performed by: SURGERY

## 2020-08-11 PROCEDURE — 3700000000 HC ANESTHESIA ATTENDED CARE: Performed by: SURGERY

## 2020-08-11 PROCEDURE — 3700000001 HC ADD 15 MINUTES (ANESTHESIA): Performed by: SURGERY

## 2020-08-11 PROCEDURE — 88341 IMHCHEM/IMCYTCHM EA ADD ANTB: CPT

## 2020-08-11 PROCEDURE — 80048 BASIC METABOLIC PNL TOTAL CA: CPT

## 2020-08-11 PROCEDURE — 2580000003 HC RX 258: Performed by: ANESTHESIOLOGY

## 2020-08-11 PROCEDURE — 6360000002 HC RX W HCPCS: Performed by: NURSE ANESTHETIST, CERTIFIED REGISTERED

## 2020-08-11 PROCEDURE — 7100000011 HC PHASE II RECOVERY - ADDTL 15 MIN: Performed by: SURGERY

## 2020-08-11 PROCEDURE — 3600000002 HC SURGERY LEVEL 2 BASE: Performed by: SURGERY

## 2020-08-11 PROCEDURE — 7100000000 HC PACU RECOVERY - FIRST 15 MIN: Performed by: SURGERY

## 2020-08-11 PROCEDURE — 7100000001 HC PACU RECOVERY - ADDTL 15 MIN: Performed by: SURGERY

## 2020-08-11 PROCEDURE — 2500000003 HC RX 250 WO HCPCS: Performed by: ANESTHESIOLOGY

## 2020-08-11 PROCEDURE — 38500 BIOPSY/REMOVAL LYMPH NODES: CPT | Performed by: SURGERY

## 2020-08-11 PROCEDURE — 88342 IMHCHEM/IMCYTCHM 1ST ANTB: CPT

## 2020-08-11 PROCEDURE — 88305 TISSUE EXAM BY PATHOLOGIST: CPT

## 2020-08-11 PROCEDURE — 2500000003 HC RX 250 WO HCPCS: Performed by: NURSE ANESTHETIST, CERTIFIED REGISTERED

## 2020-08-11 PROCEDURE — 2500000003 HC RX 250 WO HCPCS: Performed by: SURGERY

## 2020-08-11 PROCEDURE — 88184 FLOWCYTOMETRY/ TC 1 MARKER: CPT

## 2020-08-11 PROCEDURE — 3600000012 HC SURGERY LEVEL 2 ADDTL 15MIN: Performed by: SURGERY

## 2020-08-11 PROCEDURE — 88185 FLOWCYTOMETRY/TC ADD-ON: CPT

## 2020-08-11 RX ORDER — DEXMEDETOMIDINE HYDROCHLORIDE 100 UG/ML
INJECTION, SOLUTION INTRAVENOUS PRN
Status: DISCONTINUED | OUTPATIENT
Start: 2020-08-11 | End: 2020-08-11 | Stop reason: SDUPTHER

## 2020-08-11 RX ORDER — SODIUM CHLORIDE 0.9 % (FLUSH) 0.9 %
10 SYRINGE (ML) INJECTION PRN
Status: DISCONTINUED | OUTPATIENT
Start: 2020-08-11 | End: 2020-08-11 | Stop reason: HOSPADM

## 2020-08-11 RX ORDER — SODIUM CHLORIDE 0.9 % (FLUSH) 0.9 %
10 SYRINGE (ML) INJECTION EVERY 12 HOURS SCHEDULED
Status: DISCONTINUED | OUTPATIENT
Start: 2020-08-11 | End: 2020-08-11 | Stop reason: HOSPADM

## 2020-08-11 RX ORDER — PROMETHAZINE HYDROCHLORIDE 25 MG/ML
6.25 INJECTION, SOLUTION INTRAMUSCULAR; INTRAVENOUS
Status: DISCONTINUED | OUTPATIENT
Start: 2020-08-11 | End: 2020-08-11 | Stop reason: HOSPADM

## 2020-08-11 RX ORDER — HYDRALAZINE HYDROCHLORIDE 20 MG/ML
5 INJECTION INTRAMUSCULAR; INTRAVENOUS EVERY 10 MIN PRN
Status: DISCONTINUED | OUTPATIENT
Start: 2020-08-11 | End: 2020-08-11 | Stop reason: HOSPADM

## 2020-08-11 RX ORDER — OXYCODONE HYDROCHLORIDE AND ACETAMINOPHEN 5; 325 MG/1; MG/1
1 TABLET ORAL PRN
Status: DISCONTINUED | OUTPATIENT
Start: 2020-08-11 | End: 2020-08-11 | Stop reason: HOSPADM

## 2020-08-11 RX ORDER — MIDAZOLAM HYDROCHLORIDE 1 MG/ML
INJECTION INTRAMUSCULAR; INTRAVENOUS PRN
Status: DISCONTINUED | OUTPATIENT
Start: 2020-08-11 | End: 2020-08-11 | Stop reason: SDUPTHER

## 2020-08-11 RX ORDER — MEPERIDINE HYDROCHLORIDE 50 MG/ML
12.5 INJECTION INTRAMUSCULAR; INTRAVENOUS; SUBCUTANEOUS EVERY 5 MIN PRN
Status: DISCONTINUED | OUTPATIENT
Start: 2020-08-11 | End: 2020-08-11 | Stop reason: HOSPADM

## 2020-08-11 RX ORDER — OXYCODONE HYDROCHLORIDE AND ACETAMINOPHEN 5; 325 MG/1; MG/1
1 TABLET ORAL EVERY 6 HOURS PRN
Qty: 8 TABLET | Refills: 0 | Status: SHIPPED | OUTPATIENT
Start: 2020-08-11 | End: 2020-08-14

## 2020-08-11 RX ORDER — ONDANSETRON 2 MG/ML
INJECTION INTRAMUSCULAR; INTRAVENOUS PRN
Status: DISCONTINUED | OUTPATIENT
Start: 2020-08-11 | End: 2020-08-11 | Stop reason: SDUPTHER

## 2020-08-11 RX ORDER — LIDOCAINE HYDROCHLORIDE 10 MG/ML
INJECTION, SOLUTION INFILTRATION; PERINEURAL PRN
Status: DISCONTINUED | OUTPATIENT
Start: 2020-08-11 | End: 2020-08-11 | Stop reason: SDUPTHER

## 2020-08-11 RX ORDER — ROCURONIUM BROMIDE 10 MG/ML
INJECTION, SOLUTION INTRAVENOUS PRN
Status: DISCONTINUED | OUTPATIENT
Start: 2020-08-11 | End: 2020-08-11 | Stop reason: SDUPTHER

## 2020-08-11 RX ORDER — SUCCINYLCHOLINE CHLORIDE 20 MG/ML
INJECTION INTRAMUSCULAR; INTRAVENOUS PRN
Status: DISCONTINUED | OUTPATIENT
Start: 2020-08-11 | End: 2020-08-11 | Stop reason: SDUPTHER

## 2020-08-11 RX ORDER — FENTANYL CITRATE 50 UG/ML
25 INJECTION, SOLUTION INTRAMUSCULAR; INTRAVENOUS EVERY 5 MIN PRN
Status: DISCONTINUED | OUTPATIENT
Start: 2020-08-11 | End: 2020-08-11 | Stop reason: HOSPADM

## 2020-08-11 RX ORDER — PROPOFOL 10 MG/ML
INJECTION, EMULSION INTRAVENOUS PRN
Status: DISCONTINUED | OUTPATIENT
Start: 2020-08-11 | End: 2020-08-11 | Stop reason: SDUPTHER

## 2020-08-11 RX ORDER — SODIUM CHLORIDE, SODIUM LACTATE, POTASSIUM CHLORIDE, CALCIUM CHLORIDE 600; 310; 30; 20 MG/100ML; MG/100ML; MG/100ML; MG/100ML
INJECTION, SOLUTION INTRAVENOUS CONTINUOUS
Status: DISCONTINUED | OUTPATIENT
Start: 2020-08-11 | End: 2020-08-11 | Stop reason: HOSPADM

## 2020-08-11 RX ORDER — HYDROMORPHONE HCL 110MG/55ML
PATIENT CONTROLLED ANALGESIA SYRINGE INTRAVENOUS PRN
Status: DISCONTINUED | OUTPATIENT
Start: 2020-08-11 | End: 2020-08-11 | Stop reason: SDUPTHER

## 2020-08-11 RX ORDER — FENTANYL CITRATE 50 UG/ML
INJECTION, SOLUTION INTRAMUSCULAR; INTRAVENOUS PRN
Status: DISCONTINUED | OUTPATIENT
Start: 2020-08-11 | End: 2020-08-11 | Stop reason: SDUPTHER

## 2020-08-11 RX ORDER — BUPIVACAINE HYDROCHLORIDE 5 MG/ML
INJECTION, SOLUTION EPIDURAL; INTRACAUDAL PRN
Status: DISCONTINUED | OUTPATIENT
Start: 2020-08-11 | End: 2020-08-11 | Stop reason: ALTCHOICE

## 2020-08-11 RX ORDER — OXYCODONE HYDROCHLORIDE AND ACETAMINOPHEN 5; 325 MG/1; MG/1
2 TABLET ORAL PRN
Status: DISCONTINUED | OUTPATIENT
Start: 2020-08-11 | End: 2020-08-11 | Stop reason: HOSPADM

## 2020-08-11 RX ORDER — ONDANSETRON 2 MG/ML
4 INJECTION INTRAMUSCULAR; INTRAVENOUS
Status: DISCONTINUED | OUTPATIENT
Start: 2020-08-11 | End: 2020-08-11 | Stop reason: HOSPADM

## 2020-08-11 RX ADMIN — DEXMEDETOMIDINE HYDROCHLORIDE 5 MCG: 100 INJECTION, SOLUTION INTRAVENOUS at 14:38

## 2020-08-11 RX ADMIN — ONDANSETRON 4 MG: 2 INJECTION INTRAMUSCULAR; INTRAVENOUS at 14:16

## 2020-08-11 RX ADMIN — SODIUM CHLORIDE, POTASSIUM CHLORIDE, SODIUM LACTATE AND CALCIUM CHLORIDE: 600; 310; 30; 20 INJECTION, SOLUTION INTRAVENOUS at 10:09

## 2020-08-11 RX ADMIN — DEXMEDETOMIDINE HYDROCHLORIDE 5 MCG: 100 INJECTION, SOLUTION INTRAVENOUS at 14:31

## 2020-08-11 RX ADMIN — ROCURONIUM BROMIDE 20 MG: 10 SOLUTION INTRAVENOUS at 14:15

## 2020-08-11 RX ADMIN — DEXMEDETOMIDINE HYDROCHLORIDE 5 MCG: 100 INJECTION, SOLUTION INTRAVENOUS at 14:37

## 2020-08-11 RX ADMIN — DEXMEDETOMIDINE HYDROCHLORIDE 5 MCG: 100 INJECTION, SOLUTION INTRAVENOUS at 14:33

## 2020-08-11 RX ADMIN — SUCCINYLCHOLINE CHLORIDE 160 MG: 20 INJECTION, SOLUTION INTRAMUSCULAR; INTRAVENOUS at 14:02

## 2020-08-11 RX ADMIN — DEXMEDETOMIDINE HYDROCHLORIDE 5 MCG: 100 INJECTION, SOLUTION INTRAVENOUS at 14:35

## 2020-08-11 RX ADMIN — HYDROMORPHONE HYDROCHLORIDE 0.5 MG: 2 INJECTION INTRAMUSCULAR; INTRAVENOUS; SUBCUTANEOUS at 14:37

## 2020-08-11 RX ADMIN — PROPOFOL 200 MG: 10 INJECTION, EMULSION INTRAVENOUS at 14:02

## 2020-08-11 RX ADMIN — ROCURONIUM BROMIDE 10 MG: 10 SOLUTION INTRAVENOUS at 14:02

## 2020-08-11 RX ADMIN — HYDROMORPHONE HYDROCHLORIDE 0.5 MG: 2 INJECTION INTRAMUSCULAR; INTRAVENOUS; SUBCUTANEOUS at 14:40

## 2020-08-11 RX ADMIN — HYDROMORPHONE HYDROCHLORIDE 0.5 MG: 2 INJECTION INTRAMUSCULAR; INTRAVENOUS; SUBCUTANEOUS at 14:36

## 2020-08-11 RX ADMIN — SODIUM CHLORIDE, POTASSIUM CHLORIDE, SODIUM LACTATE AND CALCIUM CHLORIDE: 600; 310; 30; 20 INJECTION, SOLUTION INTRAVENOUS at 14:54

## 2020-08-11 RX ADMIN — FAMOTIDINE 20 MG: 10 INJECTION, SOLUTION INTRAVENOUS at 10:09

## 2020-08-11 RX ADMIN — SUGAMMADEX 200 MG: 100 INJECTION, SOLUTION INTRAVENOUS at 14:40

## 2020-08-11 RX ADMIN — FENTANYL CITRATE 100 MCG: 50 INJECTION INTRAMUSCULAR; INTRAVENOUS at 14:02

## 2020-08-11 RX ADMIN — MIDAZOLAM HYDROCHLORIDE 2 MG: 2 INJECTION, SOLUTION INTRAMUSCULAR; INTRAVENOUS at 13:53

## 2020-08-11 RX ADMIN — LIDOCAINE HYDROCHLORIDE 40 MG: 10 INJECTION, SOLUTION INFILTRATION; PERINEURAL at 14:02

## 2020-08-11 ASSESSMENT — LIFESTYLE VARIABLES: SMOKING_STATUS: 0

## 2020-08-11 ASSESSMENT — PULMONARY FUNCTION TESTS
PIF_VALUE: 0
PIF_VALUE: 27
PIF_VALUE: 28
PIF_VALUE: 6
PIF_VALUE: 27
PIF_VALUE: 2
PIF_VALUE: 27
PIF_VALUE: 16
PIF_VALUE: 29
PIF_VALUE: 29
PIF_VALUE: 5
PIF_VALUE: 28
PIF_VALUE: 26
PIF_VALUE: 28
PIF_VALUE: 16
PIF_VALUE: 40
PIF_VALUE: 29
PIF_VALUE: 29
PIF_VALUE: 16
PIF_VALUE: 29
PIF_VALUE: 2
PIF_VALUE: 29
PIF_VALUE: 3
PIF_VALUE: 29
PIF_VALUE: 16
PIF_VALUE: 0
PIF_VALUE: 27
PIF_VALUE: 0
PIF_VALUE: 29
PIF_VALUE: 27
PIF_VALUE: 16
PIF_VALUE: 16
PIF_VALUE: 5
PIF_VALUE: 27
PIF_VALUE: 29
PIF_VALUE: 29
PIF_VALUE: 2
PIF_VALUE: 0
PIF_VALUE: 25
PIF_VALUE: 29
PIF_VALUE: 16
PIF_VALUE: 28
PIF_VALUE: 7
PIF_VALUE: 24
PIF_VALUE: 28
PIF_VALUE: 7
PIF_VALUE: 2
PIF_VALUE: 27
PIF_VALUE: 29

## 2020-08-11 ASSESSMENT — PAIN SCALES - GENERAL: PAINLEVEL_OUTOF10: 0

## 2020-08-11 ASSESSMENT — PAIN - FUNCTIONAL ASSESSMENT: PAIN_FUNCTIONAL_ASSESSMENT: 0-10

## 2020-08-11 NOTE — PROGRESS NOTES
Patient arrived in PACU and placed on monitor in stable condition. Patient had oral airway in place on arrival however took it out himself as RN was getting him settled. Report from [address] and CRNA.  Will continue to monitor

## 2020-08-11 NOTE — PROGRESS NOTES
Preoperative Screening for Elective Surgery/Invasive Procedures While COVID-19 present in the community     Have you had any of the following symptoms? No   o Fever, chills  o Cough  o Shortness of breath  o Muscle aches/pain  o Diarrhea  o Abdominal pain, nausea, vomiting  o Loss or decrease in taste and / or smell   Risk of Exposure  o Have you recently been hospitalized for COVID-19 or flu-like illness, if so when?  o Recently diagnosed with COVID-19, if so when?  o Recently tested for COVID-19, if so when?  o Have you been in close contact with a person or family member who currently has or recently had COVID-19? If yes, when and in what context?  o Do you live with anybody who in the last 14 days has had fever, chills, shortness of breath, muscle aches, flu-like illness?  o Do you have any close contacts or family members who are currently in the hospital for COVID-19 or flu-like illness? If yes, assess recent close contact with this person. Indicate if the patient has a positive screen by answering yes to one or more of the above questions. Patients who test positive or screen positive prior to surgery or on the day of surgery should be evaluated in conjunction with the surgeon/proceduralist/anesthesiologist to determine the urgency of the procedure.        No to all above questions

## 2020-08-11 NOTE — PROGRESS NOTES
Discharge instructions reviewed with patient and wife. AVS and prescription given to wife and both verbalized understanding. IV discontinued without issue. Patient wheeled down to personal vehicle.

## 2020-08-11 NOTE — H&P
I have reviewed the history and physical and examined the patient. I find no relevant changes. I have reviewed with the patient and/or family members, during the preoperative office visit the risks, benefits, and alternatives to the procedure.     Chani Brown

## 2020-08-11 NOTE — OP NOTE
Date of Surgery: 8/11/20    Preop Dx:  Bilateral Inguinal Lymphadenopathy    Postop Dx:  Same    Procedure:  Excision of left inguinal lymph node    Surgeon:  Stan Prescott    Assistant:      Anesthesia:  GETA    EBL:   <50ml    Specimen:  Left inguinal lymph node    Complications: None    Drains/Lines:  none    Indications:  48 yo with increasing size bilateral inguinal lymphadenopathy    Description:  Patient was given adequate description of the risks and rewards of the procedure, including bleeding, infection, lymphocele, lymphatic fistula, lymphedema and freely consented. Pt was given appropriate antibiotics and brought to the OR where GETA anesthesia was induced. Pt was placed in supine position. Prepped and draped in usual sterile fashion. Area over left inguinal lymph node anesthetized with local anesthetic. Transverse incision made and carried down circumferentially around node using combination of blunt dissection and harmonic device. All recognized lymphatics controlled. Node was about 2-2.5cm in diameter and was removed and sent to pathology. Wound irrigated and hemostasis assured. Subcutaneous tissue closed with interrupted 3-0 vicryl. 4-0 monocryl used to close the epidermis. Sterile dressing placed. All suture, sponge and instrument count correct times two at end of case. Transferred to PACU in stable condition.     Efra Kelly MD

## 2020-08-14 ENCOUNTER — HOSPITAL ENCOUNTER (EMERGENCY)
Age: 40
Discharge: HOME | End: 2020-08-14
Attending: EMERGENCY MEDICINE
Payer: COMMERCIAL

## 2020-08-14 VITALS
HEART RATE: 83 BPM | HEIGHT: 75 IN | RESPIRATION RATE: 18 BRPM | WEIGHT: 315 LBS | BODY MASS INDEX: 39.17 KG/M2 | OXYGEN SATURATION: 98 % | TEMPERATURE: 98.1 F | SYSTOLIC BLOOD PRESSURE: 165 MMHG | DIASTOLIC BLOOD PRESSURE: 69 MMHG

## 2020-08-14 PROCEDURE — 99282 EMERGENCY DEPT VISIT SF MDM: CPT

## 2020-08-14 ASSESSMENT — PAIN DESCRIPTION - DESCRIPTORS: DESCRIPTORS: ACHING

## 2020-08-14 ASSESSMENT — PAIN DESCRIPTION - PAIN TYPE: TYPE: ACUTE PAIN

## 2020-08-14 ASSESSMENT — PAIN DESCRIPTION - LOCATION: LOCATION: GROIN

## 2020-08-14 ASSESSMENT — PAIN SCALES - GENERAL: PAINLEVEL_OUTOF10: 6

## 2020-08-14 ASSESSMENT — PAIN DESCRIPTION - FREQUENCY: FREQUENCY: CONTINUOUS

## 2020-08-14 ASSESSMENT — PAIN DESCRIPTION - ORIENTATION: ORIENTATION: RIGHT

## 2020-08-15 NOTE — ED PROVIDER NOTES
[facility]  ED  EMERGENCY DEPARTMENT ENCOUNTER      Pt Name: Linda Trujillo  MRN: [id number]  [place] 1980  Date of evaluation: 8/14/2020  Provider: Ange Garcia MD    CHIEF COMPLAINT       Chief Complaint   Patient presents with    Post-op Problem     Lymph node removed from Right groin on Tueday; increased pain and swelling to right leg last couple days          HISTORY OF PRESENT ILLNESS   (Location/Symptom, Timing/Onset, Context/Setting, Quality, Duration, Modifying Factors, Severity)  Note limiting factors. Linda Trujillo is a 47 y.o. male with past medical history of recent left inguinal lymph node resection on 8/11/2020 here today with left groin swelling and pain    Patient states that he has been having increased abdominal discomfort for the past few weeks to months. He had a CAT scan showing reactive lymph nodes in his pelvis and inguinal region and had an outpatient left inguinal lymph node resection performed by general surgery here on 8/11/2020. He notes since the surgery he has had some swelling to his left groin at the surgical location. Notes a mild throbbing aching pain. No fevers or chills. No redness or change in his skin color. No discharge or drainage from the surgical site. States the pain is mild throbbing. No alleviating factors. No swelling to the distal leg. HPI    Nursing Notes were reviewed. REVIEW OF SYSTEMS    (2-9 systems for level 4, 10 or more for level 5)     Review of Systems    Please see HPI for pertinent positive and negative review of system findings. A full 10 system ROS was performed and otherwise negative.         PAST MEDICAL HISTORY     Past Medical History:   Diagnosis Date    Chicken pox 3/84    Closed fracture of arm age 7    Concussion     Hx of concussions    Concussion     Decorative tattoo     Fall     GERD (gastroesophageal reflux disease)     Hormone replacement therapy     Hypertension     Obese     XAVIER SOCIAL HISTORY       Social History     Socioeconomic History    Marital status:      Spouse name: Not on file    Number of children: Not on file    Years of education: Not on file    Highest education level: Not on file   Occupational History    Not on file   Social Needs    Financial resource strain: Not on file    Food insecurity     Worry: Not on file     Inability: Not on file    Transportation needs     Medical: Not on file     Non-medical: Not on file   Tobacco Use    Smoking status: Former Smoker     Types: Cigars     Last attempt to quit: 2016     Years since quittin.2    Smokeless tobacco: Never Used   Substance and Sexual Activity    Alcohol use: Not Currently     Alcohol/week: 0.0 standard drinks     Comment: socially-rarely    Drug use: Yes     Types: Marijuana     Comment: smoke daily all day long    Sexual activity: Not on file   Lifestyle    Physical activity     Days per week: Not on file     Minutes per session: Not on file    Stress: Not on file   Relationships    Social connections     Talks on phone: Not on file     Gets together: Not on file     Attends [employer] service: Not on file     Active member of club or organization: Not on file     Attends meetings of clubs or organizations: Not on file     Relationship status: Not on file    Intimate partner violence     Fear of current or ex partner: Not on file     Emotionally abused: Not on file     Physically abused: Not on file     Forced sexual activity: Not on file   Other Topics Concern    Not on file   Social History Narrative    Not on file       SCREENINGS    Hector Coma Scale  Eye Opening: Spontaneous  Best Verbal Response: Oriented  Best Motor Response: Obeys commands  Hector Coma Scale Score: 15          PHYSICAL EXAM    (up to 7 for level 4, 8 or more for level 5)     ED Triage Vitals   BP Temp Temp Source Pulse Resp SpO2 Height Weight   20 2323 20 2321 20 23220 [phone] 08/14/20 2321 08/14/20 2321 08/14/20 2321 08/14/20 2321   (!) 165/69 98.1 °F (36.7 °C) Oral 83 18 98 % 6' 3\" (1.905 m) (!) 350 lb (158.8 kg)       Physical Exam      General appearance:  Cooperative. No acute distress. Skin:  Warm. Dry. Surgical site in left inguinal region with Dermabond in place. Mild pitting edema to the adjacent skin of the left proximal thigh and left inguinal region but no erythema or warmth. No fluctuance or mass. No significant tenderness to palpation. Ears, nose, mouth and throat:  Oral mucosa moist,  Perfusion:  intact  Respiratory: Respirations nonlabored. Neurological:  Alert. Moves all extremities spontaneously  Musculoskeletal:   Normal ROM, no deformities. Normal flexion extension of the right hip and knee. Normal internal and external rotation of the left hip          Psychiatric:  Normal mood      DIAGNOSTIC RESULTS       Labs Reviewed - No data to display    Interpretation per the Radiologist below, if obtained/available at the time of this note:    No orders to display       All other labs/imaging were within normal range or not returned as of this dictation. EMERGENCY DEPARTMENT COURSE and DIFFERENTIAL DIAGNOSIS/MDM:   Vitals:    Vitals:    08/14/20 2321 08/14/20 2323   BP:  (!) 165/69   Pulse: 83    Resp: 18    Temp: 98.1 °F (36.7 °C)    TempSrc: Oral    SpO2: 98%    Weight: (!) 350 lb (158.8 kg)    Height: 6' 3\" (1.905 m)        Patient presents emergency department today 3 days postop from left inguinal lymph node resection. He has had some swelling to the area. Appears consistent with mild lymphedema. There is no erythema no warmth. No purulent discharge or drainage. No palpable mass. Does not appear consistent with cellulitis and I have no concern for abscess at present. Suspect some mild lymphedema likely from lymph node resection.   Patient will follow-up as an outpatient but I do not feel antibiotics are necessary at present. MDM    CONSULTS     None    Critical Care:   None    REASSESSMENT          PROCEDURE     Unless otherwise noted below, none     Procedures      FINAL IMPRESSION      1. Postoperative edema    2. Lymphedema            DISPOSITION/PLAN   DISPOSITION Decision To Discharge 08/14/2020 11:37:17 PM        PATIENT REFERRED TO:  [facility] [place]  [phone]    Schedule an appointment as soon as possible for a visit         DISCHARGE MEDICATIONS:  New Prescriptions    No medications on file     Controlled Substances Monitoring:     RX Monitoring 2/25/2019   Attestation The Prescription Monitoring Report for this patient was reviewed today. Periodic Controlled Substance Monitoring Obtaining appropriate analgesic effect of treatment. ;No signs of potential drug abuse or diversion identified.        (Please note that portions of this note were completed with a voice recognition program.  Efforts were made to edit the dictations but occasionally words are mis-transcribed.)    Spencer Lindsey MD (electronically signed)  Attending Emergency Physician            Diaz Melgar MD  08/14/20 2614

## 2020-08-25 ENCOUNTER — OFFICE VISIT (OUTPATIENT)
Dept: SURGERY | Age: 40
End: 2020-08-25

## 2020-08-25 VITALS
SYSTOLIC BLOOD PRESSURE: 144 MMHG | BODY MASS INDEX: 39.17 KG/M2 | DIASTOLIC BLOOD PRESSURE: 80 MMHG | HEIGHT: 75 IN | WEIGHT: 315 LBS | TEMPERATURE: 98.9 F | HEART RATE: 84 BPM

## 2020-08-25 PROBLEM — R59.9 REACTIVE LYMPHADENOPATHY: Status: ACTIVE | Noted: 2020-08-25

## 2020-08-25 PROCEDURE — 99024 POSTOP FOLLOW-UP VISIT: CPT | Performed by: SURGERY

## 2020-08-25 NOTE — PROGRESS NOTES
New Patient [address] Surgery Nisha Mathias, [address] [facility], [address] [address], [address] [place] [address] Road  Phone: [phone]  Fax: [phone] [facility]   YOB: 1980    Date of Visit:  8/25/2020    No ref. provider found  NAKUL Up    HPI:     Surgery Progress Note    HPI:    Patient background:   46 yo M presents in the office of follow-up for lymphadenopathy with complaint of lump at surgical. Past medical history includes, but not limited to TBI with pituitary dysfunction and GERD. Pt follows with GI, with history of endoscopy for feelings of stomach fullness and decreased appetite. Pt was first at the clinic on 08/06/2020 for evaluation for Lymph node excision. Increased adenopathy in the inguinal region was seen on CT on 07/29/2020. Patient presents today to follow-up for swelling at surgical site. Allergies   Allergen Reactions    Prednisone Hives     itchy    Rocephin [Ceftriaxone] Itching     Outpatient Medications Marked as Taking for the 8/25/20 encounter (Office Visit) with Melva Dominguez MD   Medication Sig Dispense Refill    amLODIPine (NORVASC) 10 MG tablet Take 1 tablet by mouth daily TAKE ONE TABLET BY MOUTH DAILY (Patient taking differently: Take 10 mg by mouth nightly TAKE ONE TABLET BY MOUTH DAILY) 90 tablet 1    TESTOSTERONE AQUEOUS IM Inject into the skin three times a week Obtains off the internet      ferrous sulfate (FE TABS) 325 (65 Fe) MG EC tablet Take 1 tablet by mouth daily (with breakfast) 90 tablet 0    lisinopril (PRINIVIL;ZESTRIL) 5 MG tablet TAKE ONE TABLET BY MOUTH DAILY (Patient taking differently: Take 5 mg by mouth nightly TAKE ONE TABLET BY MOUTH DAILY) 90 tablet 1    pantoprazole (PROTONIX) 40 MG tablet Take 40 mg by mouth 2 times daily       Multiple Vitamin (MULTIVITAMIN PO) Take  by mouth.            Past Medical History:   Diagnosis Date    Chicken pox 3/84    Closed fracture of arm age 7    Concussion     Hx of concussions    Concussion     Decorative tattoo     Fall     GERD (gastroesophageal reflux disease)     Hormone replacement therapy     Hypertension     Obese     XAVIER treated with BiPAP     Pericarditis     Pneumonia     Prolonged emergence from general anesthesia     agitation- hx of    TBI (traumatic brain injury) ([facility] [address]     hx of    Umbilical hernia      Past Surgical History:   Procedure Laterality Date    BRONCHOSCOPY  2016    CHOLECYSTECTOMY   (?)    HERNIA REPAIR  10/14/2016               Umbilical Hernia Repair    HERNIA REPAIR      umbilical    LYMPH NODE BIOPSY Left 2020    BIOPSY OF left  INGUINAL LYMPH NODE with frozen section performed by Soraida Jones MD at [address] [facility] TYMPANOSTOMY TUBE PLACEMENT      bilaterally x 1    WISDOM TOOTH EXTRACTION      all 4     Family History   Problem Relation Age of Onset    Other Father         kidney stones    Cancer Father         bladder cancer, ? second hand smoke    Other Mother         bipolar disorder    Bipolar Disorder Mother     Depression Paternal Grandmother     Bipolar Disorder Other     Heart Disease Maternal Aunt     Cancer Maternal Grandfather         Colon     Social History     Socioeconomic History    Marital status:      Spouse name: Not on file    Number of children: Not on file    Years of education: Not on file    Highest education level: Not on file   Occupational History    Not on file   Social Needs    Financial resource strain: Not on file    Food insecurity     Worry: Not on file     Inability: Not on file    Transportation needs     Medical: Not on file     Non-medical: Not on file   Tobacco Use    Smoking status: Former Smoker     Types: Cigars     Last attempt to quit: 2016     Years since quittin.3    Smokeless tobacco: Never Used   Substance and Sexual Activity    Alcohol use: Not Currently     Alcohol/week: 0.0 standard drinks     Comment: socially-rarely    Drug use: Yes     Types: Marijuana     Comment: smoke daily all day long    Sexual activity: Not on file   Lifestyle    Physical activity     Days per week: Not on file     Minutes per session: Not on file    Stress: Not on file   Relationships    Social connections     Talks on phone: Not on file     Gets together: Not on file     Attends Restoration service: Not on file     Active member of club or organization: Not on file     Attends meetings of clubs or organizations: Not on file     Relationship status: Not on file    Intimate partner violence     Fear of current or ex partner: Not on file     Emotionally abused: Not on file     Physically abused: Not on file     Forced sexual activity: Not on file   Other Topics Concern    Not on file   Social History Narrative    Not on file          Vitals:    08/25/20 1335 08/25/20 1336   BP: (!) 145/84 (!) 144/80   Site: Left Wrist Left Wrist   Position: Sitting Sitting   Cuff Size: Medium Adult Medium Adult   Pulse: 86 84   Temp: 98.9 °F (37.2 °C)    Weight: (!) 358 lb (162.4 kg)    Height: 6' 3\" (1.905 m)      Body mass index is 44.75 kg/m².      Wt Readings from Last 3 Encounters:   08/25/20 (!) 358 lb (162.4 kg)   08/14/20 (!) 350 lb (158.8 kg)   08/11/20 (!) 356 lb (161.5 kg)     BP Readings from Last 3 Encounters:   08/25/20 (!) 144/80   08/14/20 (!) 165/69   08/11/20 111/61          REVIEW OF SYSTEMS:   · All other systems reviewed; please refer to HPI with pertinent positives, all other ROS are negative    PHYSICAL EXAM:    CONSTITUTIONAL:  awake, alert, no apparent distress and moderately obese  ENT:  normocepalic, without obvious abnormality  NECK:  supple, symmetrical, trachea midline   LUNGS:  Respirations easy, no labored breathing  CARDIOVASCULAR:  Regular Rate, No chest heaving, or visible pulsations  ABDOMEN: Scar noted over left inguinal of the problem.     ANISA [facility]

## 2021-01-16 DIAGNOSIS — I10 ESSENTIAL HYPERTENSION: ICD-10-CM

## 2021-01-18 ENCOUNTER — TELEPHONE (OUTPATIENT)
Dept: FAMILY MEDICINE CLINIC | Age: 41
End: 2021-01-18

## 2021-01-18 RX ORDER — LISINOPRIL 5 MG/1
TABLET ORAL
Qty: 30 TABLET | Refills: 0 | Status: SHIPPED | OUTPATIENT
Start: 2021-01-18 | End: 2021-01-22

## 2021-01-20 NOTE — TELEPHONE ENCOUNTER
Patient called and scheduled an ER f/u from [facility]. Patient is COVID positive. I LM to return call to advise the ER f/u appointment this Friday will be a virtual appointment with Yung Valladares. Please let patient know.

## 2021-01-22 ENCOUNTER — TELEMEDICINE (OUTPATIENT)
Dept: FAMILY MEDICINE CLINIC | Age: 41
End: 2021-01-22
Payer: COMMERCIAL

## 2021-01-22 DIAGNOSIS — I27.20 PULMONARY HTN (HCC): ICD-10-CM

## 2021-01-22 DIAGNOSIS — Z09 HOSPITAL DISCHARGE FOLLOW-UP: Primary | ICD-10-CM

## 2021-01-22 DIAGNOSIS — I10 ESSENTIAL HYPERTENSION: ICD-10-CM

## 2021-01-22 PROBLEM — R56.9 NEW ONSET SEIZURE (HCC): Status: RESOLVED | Noted: 2017-01-18 | Resolved: 2021-01-22

## 2021-01-22 PROBLEM — R53.83 OTHER FATIGUE: Status: RESOLVED | Noted: 2018-03-01 | Resolved: 2021-01-22

## 2021-01-22 PROBLEM — R93.89 ABNORMAL CXR: Status: RESOLVED | Noted: 2017-02-13 | Resolved: 2021-01-22

## 2021-01-22 PROBLEM — R55 SYNCOPE AND COLLAPSE: Status: RESOLVED | Noted: 2017-01-18 | Resolved: 2021-01-22

## 2021-01-22 PROBLEM — G43.119 INTRACTABLE MIGRAINE WITH AURA WITHOUT STATUS MIGRAINOSUS: Status: RESOLVED | Noted: 2017-10-03 | Resolved: 2021-01-22

## 2021-01-22 PROBLEM — R59.9 REACTIVE LYMPHADENOPATHY: Status: RESOLVED | Noted: 2020-08-25 | Resolved: 2021-01-22

## 2021-01-22 PROBLEM — G44.52 NEW PERSISTENT DAILY HEADACHE: Status: RESOLVED | Noted: 2017-10-03 | Resolved: 2021-01-22

## 2021-01-22 PROCEDURE — 99213 OFFICE O/P EST LOW 20 MIN: CPT | Performed by: PHYSICIAN ASSISTANT

## 2021-01-22 PROCEDURE — 1036F TOBACCO NON-USER: CPT | Performed by: PHYSICIAN ASSISTANT

## 2021-01-22 PROCEDURE — G8484 FLU IMMUNIZE NO ADMIN: HCPCS | Performed by: PHYSICIAN ASSISTANT

## 2021-01-22 PROCEDURE — G8417 CALC BMI ABV UP PARAM F/U: HCPCS | Performed by: PHYSICIAN ASSISTANT

## 2021-01-22 PROCEDURE — G8427 DOCREV CUR MEDS BY ELIG CLIN: HCPCS | Performed by: PHYSICIAN ASSISTANT

## 2021-01-22 RX ORDER — ASPIRIN 81 MG/1
81 TABLET, CHEWABLE ORAL
COMMUNITY
Start: 2021-01-17

## 2021-01-22 RX ORDER — AMLODIPINE BESYLATE 10 MG/1
10 TABLET ORAL NIGHTLY
Qty: 90 TABLET | Refills: 1 | Status: SHIPPED | OUTPATIENT
Start: 2021-01-22 | End: 2021-08-31

## 2021-01-22 RX ORDER — ATORVASTATIN CALCIUM 40 MG/1
40 TABLET, FILM COATED ORAL
COMMUNITY
Start: 2021-01-16 | End: 2021-02-24 | Stop reason: SDUPTHER

## 2021-01-22 RX ORDER — LISINOPRIL 5 MG/1
TABLET ORAL
Qty: 90 TABLET | Refills: 0 | Status: CANCELLED | OUTPATIENT
Start: 2021-01-22

## 2021-01-22 RX ORDER — LISINOPRIL 10 MG/1
10 TABLET ORAL DAILY
Qty: 90 TABLET | Refills: 1 | Status: SHIPPED | OUTPATIENT
Start: 2021-01-22 | End: 2021-04-02

## 2021-01-22 ASSESSMENT — PATIENT HEALTH QUESTIONNAIRE - PHQ9
SUM OF ALL RESPONSES TO PHQ9 QUESTIONS 1 & 2: 0
SUM OF ALL RESPONSES TO PHQ QUESTIONS 1-9: 0

## 2021-01-22 ASSESSMENT — ENCOUNTER SYMPTOMS: RESPIRATORY NEGATIVE: 1

## 2021-01-22 NOTE — PROGRESS NOTES
2021    TELEHEALTH EVALUATION -- Audio/Visual (During VMVIL-43 public health emergency)    HPI:  Patient seen today for follow up of hospital stay - at [facility]. He was having extreme dizziness and tingling in the left arm. CT normal but felt he was possible having CVA so TPA administered and admitted. Those symptoms have fully resolved. Went to [facility] ER due to chest tightness, dx with covid by rapid test though he felt fine, has had 2 negative PCR tests since then ,feels this was a false positive. He has been started on lipitor and was told to have cardiology follow up  Monitor his BP's at home, typically 140-140's/90's. Vikram Mohr (:  1980) has requested an audio/video evaluation for the following concern(s):    Hospital Discharge follow up    Review of Systems   Constitutional: Negative. Respiratory: Negative. Cardiovascular: Negative. Neurological: Negative. Prior to Visit Medications    Medication Sig Taking? Authorizing Provider   aspirin 81 MG chewable tablet Take 81 mg by mouth Yes Historical Provider, MD   atorvastatin (LIPITOR) 40 MG tablet Take 40 mg by mouth Yes Historical Provider, MD   lisinopril (PRINIVIL;ZESTRIL) 5 MG tablet TAKE ONE TABLET BY MOUTH DAILY Yes NAKUL Robles   amLODIPine (NORVASC) 10 MG tablet Take 1 tablet by mouth daily TAKE ONE TABLET BY MOUTH DAILY  Patient taking differently: Take 10 mg by mouth nightly TAKE ONE TABLET BY MOUTH DAILY Yes [address]   TESTOSTERONE AQUEOUS IM Inject into the skin three times a week Obtains off the internet Yes Historical Provider, MD   ferrous sulfate (FE TABS) 325 (65 Fe) MG EC tablet Take 1 tablet by mouth daily (with breakfast) Yes NAKUL Robles   pantoprazole (PROTONIX) 40 MG tablet Take 40 mg by mouth 2 times daily  Yes Historical Provider, MD   Multiple Vitamin (MULTIVITAMIN PO) Take  by mouth.    Yes Historical Provider, MD       Social History     Tobacco Use  Smoking status: Former Smoker     Types: Cigars     Quit date: 2016     Years since quittin.7    Smokeless tobacco: Never Used   Substance Use Topics    Alcohol use: Not Currently     Alcohol/week: 0.0 standard drinks     Comment: socially-rarely    Drug use: Yes     Types: Marijuana     Comment: smoke daily all day long          PHYSICAL EXAMINATION:       Constitutional: [x] Appears well-developed and well-nourished [x] No apparent distress      [] Abnormal-   Mental status  [x] Alert and awake  [x] Oriented to person/place/time [x]Able to follow commands      Pulmonary/Chest: [x] Respiratory effort normal.  [x] No visualized signs of difficulty breathing or respiratory distress        [] Abnormal-       ASSESSMENT/PLAN:     Diagnosis Orders   1. Hospital discharge follow-up     2. Essential hypertension  amLODIPine (NORVASC) 10 MG tablet     Will increase lisinopril to 10 mg daily ,referral to cardiology. Fide Farrar is a 47 y.o. male being evaluated by a Virtual Visit (video visit) encounter to address concerns as mentioned above. A caregiver was present when appropriate. Due to this being a TeleHealth encounter (During [facility]- public health emergency), evaluation of the following organ systems was limited: Vitals/Constitutional/EENT/Resp/CV/GI//MS/Neuro/Skin/Heme-Lymph-Imm. Pursuant to the emergency declaration under the [address] [place] [address], [address] [place] [address] authority and the [employer] Act, this Virtual Visit was conducted with patient's (and/or legal guardian's) consent, to reduce the patient's risk of exposure to COVID-19 and provide necessary medical care. The patient (and/or legal guardian) has also been advised to contact this office for worsening conditions or problems, and seek emergency medical treatment and/or call 911 if deemed necessary. Patient identification was verified at the start of the visit: Yes    Total time spent on this encounter: 36    Services were provided through a video synchronous discussion virtually to substitute for in-person clinic visit. Patient and provider were located at their individual homes. --NAKUL Nails on 1/22/2021 at 11:08 AM    An electronic signature was used to authenticate this note.

## 2021-02-17 DIAGNOSIS — I10 ESSENTIAL HYPERTENSION: ICD-10-CM

## 2021-02-17 RX ORDER — AMLODIPINE BESYLATE 10 MG/1
TABLET ORAL
Qty: 30 TABLET | Refills: 0 | OUTPATIENT
Start: 2021-02-17

## 2021-02-24 NOTE — TELEPHONE ENCOUNTER
Waleska Doretha [phone] (home)    is requesting refill(s) of medication atorvastatin to preferred pharmacy [address] [facility], [address] [place] [address] 1/22/21 (pertaining to medication)   Last refill 1/16/21 (per medication requested)  Next office visit scheduled or attempted No  Date n/a  If No, reason n/a

## 2021-02-26 RX ORDER — ATORVASTATIN CALCIUM 40 MG/1
40 TABLET, FILM COATED ORAL DAILY
Qty: 30 TABLET | Refills: 5 | Status: SHIPPED
Start: 2021-02-26 | End: 2021-07-02 | Stop reason: SINTOL

## 2021-03-09 ENCOUNTER — TELEPHONE (OUTPATIENT)
Dept: FAMILY MEDICINE CLINIC | Age: 41
End: 2021-03-09

## 2021-03-09 NOTE — TELEPHONE ENCOUNTER
I called patient and LM regarding referral from [facility] for [facility]iology. Please ask patient to call Cardiologist and schedule an appointment.

## 2021-04-01 NOTE — PROGRESS NOTES
[address]   Cardiac Consultation    Referring Provider:  NAKUL Angel     Chief Complaint   Patient presents with   [facility] Patient     est cardiac care        History of Present Illness:    Jimmie Agrawal is a 47 y.o. male who is here today as a NEW patient for ER follow up for hypertension. He presented to the ER at [address] in 2021 or tingling in his arms, dizziness, and a headache. Blood pressure 200's for SBP. Troponin's [de-identified]. CT of the head and neck were normal. There was concern for an acute stroke he was admitted to the hospital and underwent TAP infusion. MRI showed after wards showed no acute stroked. He continued to have a headache and a repeat CT of the head was normal. An echocardiogram from 1/15/2021 showed an EF of 55-60%, moderate to severe LVH. He returned to the ER at [facility] on 2021 with tingling in both hands and feet. Today he states his blood pressure at home is running 135 for SBP. He states he has been feeling well overall. He states he is active lifting weights and doing cardio. He has not had any of the symptoms that he had prior to going to the ER. He states he has had a  in his stress which has helped. He states he has bee having SOB with heavt exertion. This has been present for months and is unchanged. No assoc chest pian. Mild and resolves w/ rest. Patient currently denies any weight gain, edema, palpitations, chest pain, dizziness, and syncope. Past Medical History:   has a past medical history of Chicken pox, Closed fracture of arm, Concussion, Concussion, Decorative tattoo, Fall, GERD (gastroesophageal reflux disease), Hormone replacement therapy, Hypertension, Obese, XAVIER treated with BiPAP, Pericarditis, Pneumonia, Prolonged emergence from general anesthesia, TBI (traumatic brain injury) ([facility] [address] and Umbilical hernia. Surgical History:   has a past surgical history that includes Cholecystectomy ( (?));  Tonsillectomy and Adenoidectomy; Tympanostomy tube placement; [place] tooth extraction; Hernia repair (10/14/2016); hernia repair (10/14/2016); bronchoscopy (02/13/2016); and lymph node biopsy (Left, 8/11/2020). Social History:   reports that he quit smoking about 4 years ago. His smoking use included cigars. He has never used smokeless tobacco. He reports previous alcohol use. He reports current drug use. Drug: Marijuana. Family History:  family history includes Bipolar Disorder in his mother and another family member; Cancer in his father and maternal grandfather; Depression in his paternal grandmother; Heart Disease in his maternal aunt; Other in his father and mother. Home Medications:  Prior to Admission medications    Medication Sig Start Date End Date Taking? Authorizing Provider   atorvastatin (LIPITOR) 40 MG tablet Take 1 tablet by mouth daily 2/26/21  Yes NAKUL Valencia   aspirin 81 MG chewable tablet Take 81 mg by mouth 1/17/21  Yes Historical Provider, MD   amLODIPine (NORVASC) 10 MG tablet Take 1 tablet by mouth nightly TAKE ONE TABLET BY MOUTH DAILY 1/22/21  Yes NAKUL Valencia   lisinopril (PRINIVIL;ZESTRIL) 10 MG tablet Take 1 tablet by mouth daily 1/22/21  Yes NAKUL Valencia   TESTOSTERONE AQUEOUS IM Inject into the skin three times a week Obtains off the internet   Yes Historical Provider, MD   Multiple Vitamin (MULTIVITAMIN PO) Take  by mouth. Yes Historical Provider, MD   ferrous sulfate (FE TABS) 325 (65 Fe) MG EC tablet Take 1 tablet by mouth daily (with breakfast)  Patient not taking: Reported on 4/2/2021 8/4/20   NAKUL Valencia   pantoprazole (PROTONIX) 40 MG tablet Take 40 mg by mouth 2 times daily  4/26/19   Historical Provider, MD        Allergies:  Prednisone and Rocephin [ceftriaxone]     Review of Systems:   · Constitutional: there has been no unanticipated weight loss. There's been no change in energy level, sleep pattern, or activity level. · Eyes: No visual changes or diplopia.  No scleral icterus. · ENT: No Headaches, hearing loss or vertigo. No mouth sores or sore throat. · Cardiovascular: Reviewed in HPI  · Respiratory: No cough or wheezing, no sputum production. No hematemesis. · Gastrointestinal: No abdominal pain, appetite loss, blood in stools. No change in bowel or bladder habits. · Genitourinary: No dysuria, trouble voiding, or hematuria. · Musculoskeletal:  No gait disturbance, weakness or joint complaints. · Integumentary: No rash or pruritis. · Neurological: No headache, diplopia, change in muscle strength, numbness or tingling. No change in gait, balance, coordination, mood, affect, memory, mentation, behavior. · Psychiatric: No anxiety, no depression. · Endocrine: No malaise, fatigue or temperature intolerance. No excessive thirst, fluid intake, or urination. No tremor. · Hematologic/Lymphatic: No abnormal bruising or bleeding, blood clots or swollen lymph nodes. · Allergic/Immunologic: No nasal congestion or hives. Physical Examination:    Vitals:    04/02/21 0807   BP: (!) 154/90   Pulse:    SpO2:         Constitutional and General Appearance: NAD   Respiratory:  · Normal excursion and expansion without use of accessory muscles  · Resp Auscultation: Normal breath sounds without dullness  Cardiovascular:  · The apical impulses not displaced  · Heart tones are crisp and normal  · Cervical veins are not engorged  · The carotid upstroke is normal in amplitude and contour without delay or bruit  · Normal S1S2, No S3, No Murmur  · Peripheral pulses are symmetrical and full  · There is no clubbing, cyanosis of the extremities.   · No edema  · Femoral Arteries: 2+ and equal  · Pedal Pulses: 2+ and equal   Abdomen:  · No masses or tenderness  · Liver/Spleen: No Abnormalities Noted  Neurological/Psychiatric:  · Alert and oriented in all spheres  · Moves all extremities well  · Exhibits normal gait balance and coordination  · No abnormalities of mood, affect, memory, mentation, or behavior are noted    Cardiac MRI: Aug 2017  1. There is no evidence of ventricular interdependence.     2. The left ventricular size is normal. The left ventricular ejection fraction is 62 % by Duron's method. Global left ventricular function is normal. There are no regional wall motion  abnormalities of the left ventricular wall. The left ventricular mass is mildly increased consistent with mild left ventricular hypertrophy. Yesenia Du is no evidence of myocardial edema by  T2 weighted imaging (myocardium/skeletal muscle ratio 89/69, normal < 1.9).  High cardiac output is consistent with a hypermetabolic state. 3. The right ventricular size is normal. Global right ventricular function is normal. There are  no regional wall motion abnormalities of the right ventricular wall. 4. Left atrial size is mildly enlarged. Right atrial size is normal.  The Qp/Qs is normal measuring 1.1 by phase contrast imaging. 5. Peak velocity at the aortic valve is 1.27 corresponding to a peak gradient of 6.45 mmHg. The calculated aortic regurgitant fraction is 0.67 %. There is moderate mitral regurgitation. The calculated mitral regurgitant fraction is 28.17 %. There is moderate tricuspid regurgitation. The calculated tricuspid regurgitant fraction is 23.95 %. Peak velocity at pulmonary valve is 1.22 corresponding to a peak gradient of 5.95 mmHg. The calculated pulmonic regurgitant fraction is 1.73 %. 6. The main pulmonary artery is dilated measuring 37.0 mm. 7. Mediastinal and axillary adenopathy is noted. Cardiac MRI 2017  IMPRESSIONS:    No evidence of constrictive physiology. Left heart findings are suggestive of hypertensive heart disease. High resting cardiac output is consistent with a hypermetabolic state. The pulmonary artery is dilated.   Patient became anxious and was unable to complete contrasted portion of study.  Consider rescheduling with anxiolytic if concern for underlying

## 2021-04-02 ENCOUNTER — OFFICE VISIT (OUTPATIENT)
Dept: CARDIOLOGY CLINIC | Age: 41
End: 2021-04-02
Payer: COMMERCIAL

## 2021-04-02 VITALS
DIASTOLIC BLOOD PRESSURE: 90 MMHG | BODY MASS INDEX: 39.17 KG/M2 | HEART RATE: 84 BPM | OXYGEN SATURATION: 97 % | SYSTOLIC BLOOD PRESSURE: 154 MMHG | HEIGHT: 75 IN | WEIGHT: 315 LBS

## 2021-04-02 DIAGNOSIS — R06.02 SOB (SHORTNESS OF BREATH): ICD-10-CM

## 2021-04-02 DIAGNOSIS — R93.1 ABNORMAL ECHOCARDIOGRAM: ICD-10-CM

## 2021-04-02 DIAGNOSIS — R07.9 CHEST PAIN, UNSPECIFIED TYPE: ICD-10-CM

## 2021-04-02 DIAGNOSIS — R77.8 ELEVATED TROPONIN: ICD-10-CM

## 2021-04-02 DIAGNOSIS — I10 HYPERTENSION, UNSPECIFIED TYPE: Primary | ICD-10-CM

## 2021-04-02 PROCEDURE — 93000 ELECTROCARDIOGRAM COMPLETE: CPT | Performed by: INTERNAL MEDICINE

## 2021-04-02 PROCEDURE — G8427 DOCREV CUR MEDS BY ELIG CLIN: HCPCS | Performed by: INTERNAL MEDICINE

## 2021-04-02 PROCEDURE — G8417 CALC BMI ABV UP PARAM F/U: HCPCS | Performed by: INTERNAL MEDICINE

## 2021-04-02 PROCEDURE — 99244 OFF/OP CNSLTJ NEW/EST MOD 40: CPT | Performed by: INTERNAL MEDICINE

## 2021-04-02 RX ORDER — LISINOPRIL 40 MG/1
40 TABLET ORAL DAILY
Qty: 90 TABLET | Refills: 3 | Status: SHIPPED | OUTPATIENT
Start: 2021-04-02 | End: 2021-04-21 | Stop reason: SDUPTHER

## 2021-04-02 RX ORDER — LISINOPRIL 10 MG/1
10 TABLET ORAL DAILY
Qty: 90 TABLET | Refills: 1 | Status: CANCELLED | OUTPATIENT
Start: 2021-04-02

## 2021-04-02 NOTE — PATIENT INSTRUCTIONS
Plan:  Increase Lisinopril to 20 mg daily- call if you feel dizzy or light headed. Call if you are tolerating the 20 mg dose. Goal is to take 40 mg daily   Stress test- GXT Myoview   Discussed angiogram if stress test is abnormal   Continue other medications   Check blood pressure at home weekly  Regular exercise and following a healthy diet encouraged   Follow up with me based on testing     Your provider has ordered testing for further evaluation. An order/prescription has been included in your paper work.  To schedule outpatient testing, contact Central Scheduling by calling [address] [place] [address] ([phone]).

## 2021-04-02 NOTE — LETTER
[facility]   Cardiac Consultation    Referring Provider:  NAKUL Aden     Chief Complaint   Patient presents with   [address] [facility] Patient     est cardiac care        History of Present Illness:    Melodie Higuera is a 47 y.o. male who is here today as a NEW patient for ER follow up for hypertension. He presented to the ER at [address] in 2021 or tingling in his arms, dizziness, and a headache. Blood pressure 200's for SBP. Troponin's [de-identified]. CT of the head and neck were normal. There was concern for an acute stroke he was admitted to the hospital and underwent TAP infusion. MRI showed after wards showed no acute stroked. He continued to have a headache and a repeat CT of the head was normal. An echocardiogram from 1/15/2021 showed an EF of 55-60%, moderate to severe LVH. He returned to the ER at [facility] on 2021 with tingling in both hands and feet. Today he states his blood pressure at home is running 135 for SBP. He states he has been feeling well overall. He states he is active lifting weights and doing cardio. He has not had any of the symptoms that he had prior to going to the ER. He states he has had a  in his stress which has helped. He states he has bee having SOB with heavt exertion. This has been present for months and is unchanged. No assoc chest pian. Mild and resolves w/ rest. Patient currently denies any weight gain, edema, palpitations, chest pain, dizziness, and syncope. Past Medical History:   has a past medical history of Chicken pox, Closed fracture of arm, Concussion, Concussion, Decorative tattoo, Fall, GERD (gastroesophageal reflux disease), Hormone replacement therapy, Hypertension, Obese, XAVIER treated with BiPAP, Pericarditis, Pneumonia, Prolonged emergence from general anesthesia, TBI (traumatic brain injury) ([facility] [address] and Umbilical hernia. Surgical History:   has a past surgical history that includes Cholecystectomy ( (?));  Tonsillectomy and Adenoidectomy; Tympanostomy tube placement; [place] tooth extraction; Hernia repair (10/14/2016); hernia repair (10/14/2016); bronchoscopy (02/13/2016); and lymph node biopsy (Left, 8/11/2020). Social History:   reports that he quit smoking about 4 years ago. His smoking use included cigars. He has never used smokeless tobacco. He reports previous alcohol use. He reports current drug use. Drug: Marijuana. Family History:  family history includes Bipolar Disorder in his mother and another family member; Cancer in his father and maternal grandfather; Depression in his paternal grandmother; Heart Disease in his maternal aunt; Other in his father and mother. Home Medications:  Prior to Admission medications    Medication Sig Start Date End Date Taking? Authorizing Provider   atorvastatin (LIPITOR) 40 MG tablet Take 1 tablet by mouth daily 2/26/21  Yes NAKUL Ferrell   aspirin 81 MG chewable tablet Take 81 mg by mouth 1/17/21  Yes Historical Provider, MD   amLODIPine (NORVASC) 10 MG tablet Take 1 tablet by mouth nightly TAKE ONE TABLET BY MOUTH DAILY 1/22/21  Yes NAKUL Ferrell   lisinopril (PRINIVIL;ZESTRIL) 10 MG tablet Take 1 tablet by mouth daily 1/22/21  Yes NAKUL Ferrell   TESTOSTERONE AQUEOUS IM Inject into the skin three times a week Obtains off the internet   Yes Historical Provider, MD   Multiple Vitamin (MULTIVITAMIN PO) Take  by mouth. Yes Historical Provider, MD   ferrous sulfate (FE TABS) 325 (65 Fe) MG EC tablet Take 1 tablet by mouth daily (with breakfast)  Patient not taking: Reported on 4/2/2021 8/4/20   NAKUL Ferrell   pantoprazole (PROTONIX) 40 MG tablet Take 40 mg by mouth 2 times daily  4/26/19   Historical Provider, MD        Allergies:  Prednisone and Rocephin [ceftriaxone]     Review of Systems:   · Constitutional: there has been no unanticipated weight loss. There's been no change in energy level, sleep pattern, or activity level.      · Eyes: No visual changes or memory, mentation, or behavior are noted    Cardiac MRI: Aug 2017  1. There is no evidence of ventricular interdependence.     2. The left ventricular size is normal. The left ventricular ejection fraction is 62 % by Duron's method. Global left ventricular function is normal. There are no regional wall motion  abnormalities of the left ventricular wall. The left ventricular mass is mildly increased consistent with mild left ventricular hypertrophy. St[place]ings Ma[place] is no evidence of myocardial edema by  T2 weighted imaging (myocardium/skeletal muscle ratio 89/69, normal < 1.9).  High cardiac output is consistent with a hypermetabolic state. 3. The right ventricular size is normal. Global right ventricular function is normal. There are  no regional wall motion abnormalities of the right ventricular wall. 4. Left atrial size is mildly enlarged. Right atrial size is normal.  The Qp/Qs is normal measuring 1.1 by phase contrast imaging. 5. Peak velocity at the aortic valve is 1.27 corresponding to a peak gradient of 6.45 mmHg. The calculated aortic regurgitant fraction is 0.67 %. There is moderate mitral regurgitation. The calculated mitral regurgitant fraction is 28.17 %. There is moderate tricuspid regurgitation. The calculated tricuspid regurgitant fraction is 23.95 %. Peak velocity at pulmonary valve is 1.22 corresponding to a peak gradient of 5.95 mmHg. The calculated pulmonic regurgitant fraction is 1.73 %. 6. The main pulmonary artery is dilated measuring 37.0 mm. 7. Mediastinal and axillary adenopathy is noted. Cardiac MRI 2017  IMPRESSIONS:    No evidence of constrictive physiology. Left heart findings are suggestive of hypertensive heart disease. High resting cardiac output is consistent with a hypermetabolic state. The pulmonary artery is dilated.   Patient became anxious and was unable to complete contrasted portion of study.  Consider rescheduling with anxiolytic if concern for underlying cardiomyopathy. Echocardiogram 7/18/17  Summary   Normal LV systolic function with an estimated LVEF of 55%. MIld concentric left ventricular hypertrophy. Normal left ventricular diastolic filling pressure. Systolic pulmonary artery pressure (SPAP) is estimated at 41mmHg consistent   with mild pulmonary hypertension (RA pressure 3mmHg). EKG 6/13/2020  Normal sinus rhythm  Nonspecific ST abnormality  Abnormal ECG    Echocardiogram 1/15/2021  Summary:  The left ventricle is normal in size. There is moderate to severe concentric left ventricular hypertrophy. Left ventricular systolic function is normal. (LVEF>/=55%)  Overall left ventricular ejection fraction is estimated to be 55-60%. The diastolic function is impaired and classified as Grade 2  (psuedonormalization). Assessment:   BARRY - possible cardiac  Elevated trop 1/2021 at [facility] which was believed due to hypertensive emergency. No ischemic eval at that time. Hypertension - suboptimal   Hyperlipidemia   Moderate mitral regurgitation  Moderate tricuspid regurgitation  Moderate to severe concentric left ventricular hypertrophy. Family HX of heart diease in Uncle     Plan:  Increase Lisinopril to 20 mg daily and then 40 mg- call if you feel dizzy or light headed. Call if you are tolerating the 20 mg dose. Goal is to take 40 mg daily   Stress test- GXT Myoview   Discussed angiogram if stress test is abnormal   Continue other medications   Check blood pressure at home weekly  Regular exercise and following a healthy diet encouraged   Follow up with me based on testing       Scribe's attestation: This note was scribed in the presence of Dr. Dottie Trinh M.D. By Rosaura Watkins RN    The scribes documentation has been prepared under my direction and personally reviewed by me in its entirety. I confirm that the note above accurately reflects all work, treatment, procedures, and medical decision making performed by me.     Dr. Dottie Trinh MD Thank you for allowing me to participate in the care of this individual.      Trudy Johns.  Mana Pablo M.D., Carmel Farr

## 2021-04-12 ENCOUNTER — HOSPITAL ENCOUNTER (OUTPATIENT)
Dept: NUCLEAR MEDICINE | Age: 41
Discharge: HOME | End: 2021-04-12
Payer: COMMERCIAL

## 2021-04-12 DIAGNOSIS — R11.0 NAUSEA: ICD-10-CM

## 2021-04-12 DIAGNOSIS — R14.0 ABDOMINAL DISTENTION: ICD-10-CM

## 2021-04-12 PROCEDURE — 3430000000 HC RX DIAGNOSTIC RADIOPHARMACEUTICAL: Performed by: INTERNAL MEDICINE

## 2021-04-12 PROCEDURE — 78264 GASTRIC EMPTYING IMG STUDY: CPT

## 2021-04-12 PROCEDURE — A9541 TC99M SULFUR COLLOID: HCPCS | Performed by: INTERNAL MEDICINE

## 2021-04-12 RX ADMIN — Medication 1.1 MILLICURIE: at 07:30

## 2021-04-21 RX ORDER — LISINOPRIL 40 MG/1
40 TABLET ORAL DAILY
Qty: 90 TABLET | Refills: 3 | Status: SHIPPED | OUTPATIENT
Start: 2021-04-21 | End: 2022-05-02 | Stop reason: SDUPTHER

## 2021-05-21 DIAGNOSIS — G47.30 SLEEP APNEA, UNSPECIFIED TYPE: Primary | ICD-10-CM

## 2021-06-01 ENCOUNTER — HOSPITAL ENCOUNTER (OUTPATIENT)
Dept: CARDIOLOGY | Age: 41
Discharge: HOME | End: 2021-06-01
Payer: COMMERCIAL

## 2021-06-01 DIAGNOSIS — R77.8 ELEVATED TROPONIN: ICD-10-CM

## 2021-06-01 DIAGNOSIS — I10 HYPERTENSION, UNSPECIFIED TYPE: ICD-10-CM

## 2021-06-01 DIAGNOSIS — R06.02 SOB (SHORTNESS OF BREATH): ICD-10-CM

## 2021-06-01 LAB
LV EF: 59 %
LVEF MODALITY: NORMAL

## 2021-06-01 PROCEDURE — 3430000000 HC RX DIAGNOSTIC RADIOPHARMACEUTICAL: Performed by: INTERNAL MEDICINE

## 2021-06-01 PROCEDURE — 93017 CV STRESS TEST TRACING ONLY: CPT

## 2021-06-01 PROCEDURE — A9502 TC99M TETROFOSMIN: HCPCS | Performed by: INTERNAL MEDICINE

## 2021-06-01 PROCEDURE — 78452 HT MUSCLE IMAGE SPECT MULT: CPT

## 2021-06-01 RX ADMIN — TETROFOSMIN 30.6 MILLICURIE: 1.38 INJECTION, POWDER, LYOPHILIZED, FOR SOLUTION INTRAVENOUS at 08:21

## 2021-06-02 ENCOUNTER — HOSPITAL ENCOUNTER (OUTPATIENT)
Dept: CARDIOLOGY | Age: 41
Discharge: HOME | End: 2021-06-02
Payer: COMMERCIAL

## 2021-06-02 ENCOUNTER — TELEPHONE (OUTPATIENT)
Dept: CARDIOLOGY CLINIC | Age: 41
End: 2021-06-02

## 2021-06-02 PROCEDURE — 3430000000 HC RX DIAGNOSTIC RADIOPHARMACEUTICAL: Performed by: INTERNAL MEDICINE

## 2021-06-02 PROCEDURE — A9502 TC99M TETROFOSMIN: HCPCS | Performed by: INTERNAL MEDICINE

## 2021-06-02 RX ADMIN — TETROFOSMIN 33.2 MILLICURIE: 1.38 INJECTION, POWDER, LYOPHILIZED, FOR SOLUTION INTRAVENOUS at 07:40

## 2021-06-02 NOTE — TELEPHONE ENCOUNTER
Spoke with patient. Patient is scheduled with Dr. Lewis Zelaya for Left Heart Cath on 6/8/21 (pending insurance approval) at [facility], arrival time of 6:30am to the Cath Lab. Please have patient arrive to the main entrance of [facility] and check in with the registration desk. Remind patient to be NPO after midnight (8 hours prior). Do not apply lotions/creams on skin the day of procedure. Patient aware to go to ER if needed. [facility], [address] [place] [address].

## 2021-06-02 NOTE — TELEPHONE ENCOUNTER
Josué Hope    I saw as new patient in office. Was at [facility] w/ HTN emergency. No prior cardiac history. Had elevated trop but no ischemic evaluation at that time. I ordered OP stress test that is abnormal - inferior ischemia. Call me if questions.      Thanks  Jorge Leach

## 2021-06-02 NOTE — TELEPHONE ENCOUNTER
----- Message from Morenita Talavera MD sent at 6/2/2021  1:39 PM EDT -----  Call  Stress test abnormal - s/o blockage  Sched cardiac cath asap   ER if pain worsens

## 2021-06-02 NOTE — TELEPHONE ENCOUNTER
Patient can take Lisinopril, Lipitor, ASA, Norvasc, and Protonix the morning of the procedure with a small sip of water. He should hold his Iron and vitamins.

## 2021-06-02 NOTE — TELEPHONE ENCOUNTER
I spoke with patient. He is agreeable to angiogram. I advised him to go to the with chest pain. Per [facility], schedule ASAP.

## 2021-06-08 ENCOUNTER — HOSPITAL ENCOUNTER (OUTPATIENT)
Dept: CARDIAC CATH/INVASIVE PROCEDURES | Age: 41
Discharge: HOME | End: 2021-06-08
Attending: INTERNAL MEDICINE | Admitting: INTERNAL MEDICINE
Payer: COMMERCIAL

## 2021-06-08 VITALS — HEIGHT: 75 IN | WEIGHT: 315 LBS | BODY MASS INDEX: 39.17 KG/M2

## 2021-06-08 LAB
A/G RATIO: 0.9 (ref 1.1–2.2)
ALBUMIN SERPL-MCNC: 3.7 G/DL (ref 3.4–5)
ALP BLD-CCNC: 81 U/L (ref 40–129)
ALT SERPL-CCNC: 61 U/L (ref 10–40)
ANION GAP SERPL CALCULATED.3IONS-SCNC: 7 MMOL/L (ref 3–16)
AST SERPL-CCNC: 33 U/L (ref 15–37)
BILIRUB SERPL-MCNC: 0.5 MG/DL (ref 0–1)
BUN BLDV-MCNC: 21 MG/DL (ref 7–20)
CALCIUM SERPL-MCNC: 9.5 MG/DL (ref 8.3–10.6)
CHLORIDE BLD-SCNC: 107 MMOL/L (ref 99–110)
CHOLESTEROL, TOTAL: 164 MG/DL (ref 0–199)
CO2: 24 MMOL/L (ref 21–32)
CREAT SERPL-MCNC: 1.2 MG/DL (ref 0.9–1.3)
GFR AFRICAN AMERICAN: >60
GFR NON-AFRICAN AMERICAN: >60
GLOBULIN: 4 G/DL
GLUCOSE BLD-MCNC: 116 MG/DL (ref 70–99)
HCT VFR BLD CALC: 44.3 % (ref 40.5–52.5)
HDLC SERPL-MCNC: 30 MG/DL (ref 40–60)
HEMOGLOBIN: 15.3 G/DL (ref 13.5–17.5)
INR BLD: 1.12 (ref 0.86–1.14)
LDL CHOLESTEROL CALCULATED: 94 MG/DL
MCH RBC QN AUTO: 27.3 PG (ref 26–34)
MCHC RBC AUTO-ENTMCNC: 34.5 G/DL (ref 31–36)
MCV RBC AUTO: 79 FL (ref 80–100)
PDW BLD-RTO: 14.3 % (ref 12.4–15.4)
PLATELET # BLD: 214 K/UL (ref 135–450)
PMV BLD AUTO: 7.6 FL (ref 5–10.5)
POTASSIUM SERPL-SCNC: 4.3 MMOL/L (ref 3.5–5.1)
PROTHROMBIN TIME: 13 SEC (ref 10–13.2)
RBC # BLD: 5.61 M/UL (ref 4.2–5.9)
SODIUM BLD-SCNC: 138 MMOL/L (ref 136–145)
TOTAL PROTEIN: 7.7 G/DL (ref 6.4–8.2)
TRIGL SERPL-MCNC: 201 MG/DL (ref 0–150)
VLDLC SERPL CALC-MCNC: 40 MG/DL
WBC # BLD: 5.5 K/UL (ref 4–11)

## 2021-06-08 PROCEDURE — 99152 MOD SED SAME PHYS/QHP 5/>YRS: CPT

## 2021-06-08 PROCEDURE — 6370000000 HC RX 637 (ALT 250 FOR IP)

## 2021-06-08 PROCEDURE — C1894 INTRO/SHEATH, NON-LASER: HCPCS

## 2021-06-08 PROCEDURE — 93458 L HRT ARTERY/VENTRICLE ANGIO: CPT | Performed by: INTERNAL MEDICINE

## 2021-06-08 PROCEDURE — 93005 ELECTROCARDIOGRAM TRACING: CPT | Performed by: INTERNAL MEDICINE

## 2021-06-08 PROCEDURE — 85610 PROTHROMBIN TIME: CPT

## 2021-06-08 PROCEDURE — 80061 LIPID PANEL: CPT

## 2021-06-08 PROCEDURE — 93458 L HRT ARTERY/VENTRICLE ANGIO: CPT

## 2021-06-08 PROCEDURE — 2500000003 HC RX 250 WO HCPCS

## 2021-06-08 PROCEDURE — C1769 GUIDE WIRE: HCPCS

## 2021-06-08 PROCEDURE — 6360000002 HC RX W HCPCS

## 2021-06-08 PROCEDURE — 85027 COMPLETE CBC AUTOMATED: CPT

## 2021-06-08 PROCEDURE — C1887 CATHETER, GUIDING: HCPCS

## 2021-06-08 PROCEDURE — 80053 COMPREHEN METABOLIC PANEL: CPT

## 2021-06-08 RX ORDER — SODIUM CHLORIDE 9 MG/ML
25 INJECTION, SOLUTION INTRAVENOUS PRN
Status: DISCONTINUED | OUTPATIENT
Start: 2021-06-08 | End: 2021-06-08 | Stop reason: HOSPADM

## 2021-06-08 RX ORDER — SODIUM CHLORIDE 0.9 % (FLUSH) 0.9 %
5-40 SYRINGE (ML) INJECTION EVERY 12 HOURS SCHEDULED
Status: DISCONTINUED | OUTPATIENT
Start: 2021-06-08 | End: 2021-06-08 | Stop reason: HOSPADM

## 2021-06-08 RX ORDER — FENTANYL CITRATE 50 UG/ML
INJECTION, SOLUTION INTRAMUSCULAR; INTRAVENOUS
Status: COMPLETED | OUTPATIENT
Start: 2021-06-08 | End: 2021-06-08

## 2021-06-08 RX ORDER — SODIUM CHLORIDE 0.9 % (FLUSH) 0.9 %
5-40 SYRINGE (ML) INJECTION PRN
Status: DISCONTINUED | OUTPATIENT
Start: 2021-06-08 | End: 2021-06-08 | Stop reason: HOSPADM

## 2021-06-08 RX ORDER — MIDAZOLAM HYDROCHLORIDE 5 MG/ML
INJECTION INTRAMUSCULAR; INTRAVENOUS
Status: COMPLETED | OUTPATIENT
Start: 2021-06-08 | End: 2021-06-08

## 2021-06-08 RX ORDER — SODIUM CHLORIDE 9 MG/ML
1000 INJECTION, SOLUTION INTRAVENOUS CONTINUOUS
Status: DISCONTINUED | OUTPATIENT
Start: 2021-06-08 | End: 2021-06-08 | Stop reason: HOSPADM

## 2021-06-08 RX ORDER — ACETAMINOPHEN 325 MG/1
650 TABLET ORAL EVERY 4 HOURS PRN
Status: DISCONTINUED | OUTPATIENT
Start: 2021-06-08 | End: 2021-06-08 | Stop reason: HOSPADM

## 2021-06-08 RX ORDER — ASPIRIN 325 MG
325 TABLET ORAL ONCE
Status: COMPLETED | OUTPATIENT
Start: 2021-06-08 | End: 2021-06-08

## 2021-06-08 RX ORDER — HEPARIN SODIUM 1000 [USP'U]/ML
INJECTION, SOLUTION INTRAVENOUS; SUBCUTANEOUS
Status: COMPLETED | OUTPATIENT
Start: 2021-06-08 | End: 2021-06-08

## 2021-06-08 RX ADMIN — MIDAZOLAM HYDROCHLORIDE 1 MG: 5 INJECTION INTRAMUSCULAR; INTRAVENOUS at 09:18

## 2021-06-08 RX ADMIN — HEPARIN SODIUM 5000 UNITS: 1000 INJECTION, SOLUTION INTRAVENOUS; SUBCUTANEOUS at 09:25

## 2021-06-08 RX ADMIN — FENTANYL CITRATE 50 MCG: 50 INJECTION, SOLUTION INTRAMUSCULAR; INTRAVENOUS at 09:18

## 2021-06-08 RX ADMIN — FENTANYL CITRATE 50 MCG: 50 INJECTION, SOLUTION INTRAMUSCULAR; INTRAVENOUS at 09:22

## 2021-06-08 RX ADMIN — MIDAZOLAM HYDROCHLORIDE 1 MG: 5 INJECTION INTRAMUSCULAR; INTRAVENOUS at 09:22

## 2021-06-08 RX ADMIN — Medication 325 MG: at 07:43

## 2021-06-08 NOTE — PROCEDURES
CARDIAC CATHETERIZATION REPORT    Date of Procedure: 6/8/2021  : Gertrudis Grubbs DO  Primary Indication: Troponin elevation, abnormal nuclear SPECT stress test    Procedures Performed:  1. Coronary angiography  2. Left heart catheterization  3. Moderate conscious sedation    Procedural Details:  1. Access: Local anesthetic was given and access was obtained in the right radial artery using a micropuncture technique and a 6F Terumo Slender Sheath was placed without difficulty. 2. Diagnostic: A 5F TIG catheter was used to perform selective right and left coronary angiography. The 5F TIG catheter was used to perform the left heart catheterization. No significant gradient was observed on pull-back of the catheter across the aortic valve. 3. Hemostasis: At the end of the procedure, the radial sheath was removed and a hemoband was placed over the arteriotomy site and filled with air to maintain hemostasis. Findings:  1. Hemodynamics:     A. Opening arterial pressure: 104/72 (84) mmHg      B. LVEDP: 17 mmHg    2. Coronary anatomy:  A. Left main artery: The left main artery bifurcates into the left anterior descending artery and left circumflex artery. The left main artery has minor luminal irregularities. B. Left anterior descending artery: Transapical vessel which gives rise to 2 diagonal arteries. The proximal LAD is ectatic and has mild disease. The mid-LAD has a 20% stenosis. The distal LAD tapers to a relatively small caliber vessel and has minor luminal irregularities. The diagonal arteries have minor luminal irregularities. C. Left circumflex artery: Non-dominant vessel that gives rise to 2 obtuse marginal arteries. The LCx has a 20% ostial stenosis. The remainder of the vessel has minor luminal irregularities. The OM1 is a large vessel with minor luminal irregularities. The OM2 is a small vessel with minor luminal irregularities.   D. Right coronary artery: Very large, dominant vessel that gives rise to the posterior descending artery and posterolateral branch. The RCA has minor luminal irregularities. The posterior descending artery has a 30% stenosis in the mid-segment. The PLB has minor luminal irregularities. Technical Factors:  Complications: None. Estimated blood loss: Minimal.  Radiation: Air kerma 730 mGy and 1.5 minutes of fluoroscopy  Sedation: Moderate conscious sedation was administered by qualified nursing personnel under continuous hemodynamic monitoring, starting at 9:18 AM and ending at 9:33 AM.  Medications: 2.5 mg IA verapamil, 2 mg IV Versed, 100 mcg IV Fentanyl, 5,000 units IV heparin  Contrast: 60 cc of Optiray    Impression:  1. Mild non-obstructive coronary artery disease. 2. Suspect mild troponin elevation at outside hospital was likely secondary to type 2 demand ischemia in setting of hypertensive urgency. 3. Mildly elevated LVEDP. Plan:  1. Continue aspirin 81 mg daily and atorvastatin 40 mg daily. 2. Continue lisinopril 40 mg daily and amlodipine 10 mg daily. 3. Optimal medical therapy for CAD risk factors. 4. Follow-up with [facility] in one month.       Michael Dean, [address]

## 2021-06-09 ENCOUNTER — TELEPHONE (OUTPATIENT)
Dept: CARDIOLOGY CLINIC | Age: 41
End: 2021-06-09

## 2021-06-09 LAB
EKG ATRIAL RATE: 75 BPM
EKG DIAGNOSIS: NORMAL
EKG P AXIS: 52 DEGREES
EKG P-R INTERVAL: 170 MS
EKG Q-T INTERVAL: 388 MS
EKG QRS DURATION: 96 MS
EKG QTC CALCULATION (BAZETT): 433 MS
EKG R AXIS: 29 DEGREES
EKG T AXIS: 40 DEGREES
EKG VENTRICULAR RATE: 75 BPM

## 2021-06-09 PROCEDURE — 93010 ELECTROCARDIOGRAM REPORT: CPT | Performed by: INTERNAL MEDICINE

## 2021-06-09 NOTE — TELEPHONE ENCOUNTER
Procedure was completed by Dr Amy Pagan. For future, please send message to the provider who performed the procedure. I called the patient and did not get response. Please call re-attempt to call patient and instruct to go to the ER. Unclear what he is dealing with. Could be reaction to the contrast but would best handled if seen.

## 2021-06-09 NOTE — TELEPHONE ENCOUNTER
Pt called and sts that he had a LHC done yesterday 6/8/21. Pt sts that when he got home his hands and feet were having a burning sensation. Pt sts that it hurt to walk due to the burning and sts that his eyes felt like they were burning as well. Pt wants to know if he is having a reaction to any anesthesia or meds that were given to him in the hosp.  Please advise and contact pt @ [phone]

## 2021-06-10 ENCOUNTER — TELEPHONE (OUTPATIENT)
Dept: PULMONOLOGY | Age: 41
End: 2021-06-10

## 2021-06-10 NOTE — TELEPHONE ENCOUNTER
Patient did not show for sleep eval appointment referred by Mendoza Ballard with  on 6/10/21    Same Day Cancellation: No    Patient rescheduled:  No    New appointment: n/a    Patient was also no show on: n/a    LOV   Assessment: 10/11/17      · Severe XAVIER. BiPAP 18/13 cmH2O. Full face mask. · Dyspnea since 2014. Unclear etiology. Normal TSH and Hb. Negative D dimer. Normal MIP and MEP. R hemidiaphragm, reactive airway disease, obesity, pulm HTN, deconditioning and anxiety are contributing. Not able to CPET due to weight. 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 Negative bronch for EBL 2/13/2017   · Abnormal CT chest 2/22/2017 with RLL patchy ASD- likely due to BAL done on Bronch. Resolved on repeat CT chest    · Morbid obesity          Plan:       · Albuterol 2 puffs Q4-6 hrs PRN  · Trial of Symbicort 160/4.5 2 puffs BID   · PFTs and 6MW   · CPET   · Advised to use BiPAP 6-8 hrs at night and during naps. · Replacement of mask, tubing, head straps every 3-6 months or sooner if damaged. · Follow up BIPAP compliance and pressure adjustment if needed  · Sleep hygiene  · Avoid sedatives, alcohol and caffeinated drinks at bed time. · No driving motorized vehicles or operating heavy machinery while fatigue, drowsy or sleepy. · Weight loss is also recommended as a long-term intervention.     · Complications of XAVIER if not treated were discussed with patient patient to include systemic hypertension, pulmonary hypertension, cardiovascular morbidities, car accidents and all cause mortality.

## 2021-06-30 ENCOUNTER — TELEPHONE (OUTPATIENT)
Dept: FAMILY MEDICINE CLINIC | Age: 41
End: 2021-06-30

## 2021-06-30 NOTE — PROGRESS NOTES
[facility]   Cardiac Consultation    Referring Provider:  NAKUL Gan     Chief Complaint   Patient presents with    Follow-up    Hypertension    Coronary Artery Disease        History of Present Illness:    [place] is a 46 y.o. male who is here today as  follow up for hypertension. He presented to the ER at [address] in 1/14/2021 or tingling in his arms, dizziness, and a headache. Blood pressure 200's for SBP. Troponin's [de-identified]. CT of the head and neck were normal. There was concern for an acute stroke he was admitted to the hospital and underwent TAP infusion. MRI showed after wards showed no acute stroked. He continued to have a headache and a repeat CT of the head was normal. An echocardiogram from 1/15/2021 showed an EF of 55-60%, moderate to severe LVH. He returned to the ER at [facility] on 1/17/2021 with tingling in both hands and feet. He underwent cardiac cath on 6/8/2021 that demonstrated mild nonobstructive CAD and mildly elevated LVEDP. Today, he reports that he has been doing well. Notes no problems with wrist IV site. He checks BP at home and systolic is ranging 281-492. He did stop the statin. He reports that is made him feel bad, muscle aches. He started feeling better once he stopped. He has been watching his diet and decreasing portion sizes. He only drinks water. Patient denies chest pain, sob, palpitations, dizziness or syncope. He continues to power lift. He has started riding stationary bike.     Past Medical History:   has a past medical history of Chicken pox, Closed fracture of arm, Concussion, Concussion, Coronary artery disease involving native coronary artery of native heart, Decorative tattoo, Fall, GERD (gastroesophageal reflux disease), Hormone replacement therapy, Hypertension, Obese, XAVIER treated with BiPAP, Pericarditis, Pneumonia, Prolonged emergence from general anesthesia, TBI (traumatic brain injury) ([facility] [address] and Umbilical hernia. Surgical History:   has a past surgical history that includes Cholecystectomy (2007 (?)); Tonsillectomy and Adenoidectomy; Tympanostomy tube placement; [place] tooth extraction; Hernia repair (10/14/2016); hernia repair (10/14/2016); bronchoscopy (02/13/2016); and lymph node biopsy (Left, 8/11/2020). Social History:   reports that he quit smoking about 5 years ago. His smoking use included cigars. He has never used smokeless tobacco. He reports previous alcohol use. He reports current drug use. Drug: Marijuana. Family History:  family history includes Bipolar Disorder in his mother and another family member; Cancer in his father and maternal grandfather; Depression in his paternal grandmother; Heart Disease in his maternal aunt; Other in his father and mother. Home Medications:  Prior to Admission medications    Medication Sig Start Date End Date Taking? Authorizing Provider   lisinopril (PRINIVIL;ZESTRIL) 40 MG tablet Take 1 tablet by mouth daily 4/21/21  Yes Andre Piper MD   atorvastatin (LIPITOR) 40 MG tablet Take 1 tablet by mouth daily 2/26/21  Yes NAKUL Hernandez   aspirin 81 MG chewable tablet Take 81 mg by mouth 1/17/21  Yes Historical Provider, MD   amLODIPine (NORVASC) 10 MG tablet Take 1 tablet by mouth nightly TAKE ONE TABLET BY MOUTH DAILY 1/22/21  Yes NAKUL Hernandez   TESTOSTERONE AQUEOUS IM Inject into the skin three times a week Obtains off the internet   Yes Historical Provider, MD   pantoprazole (PROTONIX) 40 MG tablet Take 40 mg by mouth 2 times daily  4/26/19  Yes Historical Provider, MD   Multiple Vitamin (MULTIVITAMIN PO) Take  by mouth.      Yes Historical Provider, MD   ferrous sulfate (FE TABS) 325 (65 Fe) MG EC tablet Take 1 tablet by mouth daily (with breakfast)  Patient not taking: Reported on 7/2/2021 8/4/20   NAKUL Hernandez        Allergies:  Prednisone and Rocephin [ceftriaxone]     Review of Systems:   · Constitutional: there has been no unanticipated weight loss. There's been no change in energy level, sleep pattern, or activity level. · Eyes: No visual changes or diplopia. No scleral icterus. · ENT: No Headaches, hearing loss or vertigo. No mouth sores or sore throat. · Cardiovascular: Reviewed in HPI  · Respiratory: No cough or wheezing, no sputum production. No hematemesis. · Gastrointestinal: No abdominal pain, appetite loss, blood in stools. No change in bowel or bladder habits. · Genitourinary: No dysuria, trouble voiding, or hematuria. · Musculoskeletal:  No gait disturbance, weakness or joint complaints. · Integumentary: No rash or pruritis. · Neurological: No headache, diplopia, change in muscle strength, numbness or tingling. No change in gait, balance, coordination, mood, affect, memory, mentation, behavior. · Psychiatric: No anxiety, no depression. · Endocrine: No malaise, fatigue or temperature intolerance. No excessive thirst, fluid intake, or urination. No tremor. · Hematologic/Lymphatic: No abnormal bruising or bleeding, blood clots or swollen lymph nodes. · Allergic/Immunologic: No nasal congestion or hives. Physical Examination:    Vitals:    07/02/21 0859   BP: 128/74   Pulse: 90   SpO2: 98%        Constitutional and General Appearance: NAD   Respiratory:  · Normal excursion and expansion without use of accessory muscles  · Resp Auscultation: Normal breath sounds without dullness  Cardiovascular:  · The apical impulses not displaced  · Heart tones are crisp and normal  · Cervical veins are not engorged  · The carotid upstroke is normal in amplitude and contour without delay or bruit  · Normal S1S2, No S3, No Murmur  · Peripheral pulses are symmetrical and full  · There is no clubbing, cyanosis of the extremities.   · No edema  · Femoral Arteries: 2+ and equal  · Pedal Pulses: 2+ and equal   Abdomen:  · No masses or tenderness  · Liver/Spleen: No Abnormalities Noted  Neurological/Psychiatric:  · Alert and oriented in all spheres  · Moves all extremities well  · Exhibits normal gait balance and coordination  · No abnormalities of mood, affect, memory, mentation, or behavior are noted    Cardiac Cath 6/8/2012  Findings:   1. Hemodynamics:      A. Opening arterial pressure: 104/72 (84) mmHg       B. LVEDP: 17 mmHg     2. Coronary anatomy:   A. Left main artery: The left main artery bifurcates into the   left anterior descending artery and left circumflex artery. The   left main artery has minor luminal irregularities. B. Left anterior descending artery: Transapical vessel which   gives rise to 2 diagonal arteries. The proximal LAD is ectatic   and has mild disease. The mid-LAD has a 20% stenosis. The   distal LAD tapers to a relatively small caliber vessel and has   minor luminal irregularities. The diagonal arteries have minor   luminal irregularities. C. Left circumflex artery: Non-dominant vessel that gives rise to   2 obtuse marginal arteries. The LCx has a 20% ostial stenosis. The remainder of the vessel has minor luminal irregularities. The OM1 is a large vessel with minor luminal irregularities. The   OM2 is a small vessel with minor luminal irregularities. D. Right coronary artery: Very large, dominant vessel that gives   rise to the posterior descending artery and posterolateral   branch. The RCA has minor luminal irregularities. The posterior   descending artery has a 30% stenosis in the mid-segment. The PLB   has minor luminal irregularities. Impression:   1. Mild non-obstructive coronary artery disease. 2. Suspect mild troponin elevation at outside hospital was likely   secondary to type 2 demand ischemia in setting of hypertensive   urgency. 3. Mildly elevated LVEDP. Plan:   1. Continue aspirin 81 mg daily and atorvastatin 40 mg daily. 2. Continue lisinopril 40 mg daily and amlodipine 10 mg daily. 3. Optimal medical therapy for CAD risk factors.    4. Follow-up with [facility] [place] in one     EKG 6/8/21  NSR 75 bpm    Nuclear Stress Test 6/1/2021  Summary Low normal LVEF 59%  Inferior hypokinesis  Mixed inferior ischemia/scar  Cannot exclude basal anterior wall scar   Overall, this would be considered an abnormal, high risk, study       Cardiac MRI: Aug 2017  1. There is no evidence of ventricular interdependence.     2. The left ventricular size is normal. The left ventricular ejection fraction is 62 % by Duron's method. Global left ventricular function is normal. There are no regional wall motion  abnormalities of the left ventricular wall. The left ventricular mass is mildly increased consistent with mild left ventricular hypertrophy. Issa Mehta is no evidence of myocardial edema by  T2 weighted imaging (myocardium/skeletal muscle ratio 89/69, normal < 1.9).  High cardiac output is consistent with a hypermetabolic state. 3. The right ventricular size is normal. Global right ventricular function is normal. There are  no regional wall motion abnormalities of the right ventricular wall. 4. Left atrial size is mildly enlarged. Right atrial size is normal.  The Qp/Qs is normal measuring 1.1 by phase contrast imaging. 5. Peak velocity at the aortic valve is 1.27 corresponding to a peak gradient of 6.45 mmHg. The calculated aortic regurgitant fraction is 0.67 %. There is moderate mitral regurgitation. The calculated mitral regurgitant fraction is 28.17 %. There is moderate tricuspid regurgitation. The calculated tricuspid regurgitant fraction is 23.95 %. Peak velocity at pulmonary valve is 1.22 corresponding to a peak gradient of 5.95 mmHg. The calculated pulmonic regurgitant fraction is 1.73 %. 6. The main pulmonary artery is dilated measuring 37.0 mm. 7. Mediastinal and axillary adenopathy is noted. Cardiac MRI 2017  IMPRESSIONS:    No evidence of constrictive physiology. Left heart findings are suggestive of hypertensive heart disease.   High resting cardiac output is consistent with a hypermetabolic state. The pulmonary artery is dilated. Patient became anxious and was unable to complete contrasted portion of study.  Consider rescheduling with anxiolytic if concern for underlying cardiomyopathy. Echocardiogram 7/18/17  Summary   Normal LV systolic function with an estimated LVEF of 55%. MIld concentric left ventricular hypertrophy. Normal left ventricular diastolic filling pressure. Systolic pulmonary artery pressure (SPAP) is estimated at 41mmHg consistent   with mild pulmonary hypertension (RA pressure 3mmHg). EKG 6/13/2020  Normal sinus rhythm  Nonspecific ST abnormality  Abnormal ECG    Echocardiogram 1/15/2021  Summary:  The left ventricle is normal in size. There is moderate to severe concentric left ventricular hypertrophy. Left ventricular systolic function is normal. (LVEF>/=55%)  Overall left ventricular ejection fraction is estimated to be 55-60%. The diastolic function is impaired and classified as Grade 2  (psuedonormalization). Assessment:   Mild non-obstructive CAD - stable w/o angina  Elevated trop 1/2021 at [facility] which was believed due to hypertensive emergency. Hypertension - now controlled   Hyperlipidemia - not sig increased but w/ CAD will benefit from statin and reduced LDL. Did not tolerate lipitor. Willing to try a lower dose different statin   Moderate mitral regurgitation  Moderate tricuspid regurgitation  Moderate to severe concentric left ventricular hypertrophy. Plan:  Recommend trying Crestor 10 mg daily  If you do not tolerate Crestor then we can try Zetia   Repeat FLP and CMP in two months  Cardiac medications reviewed including indications and pertinent side effects    Check blood pressure and heart rate at home a few times per week- keep a log with dates and times and bring to office visit   Regular exercise and following a healthy diet encouraged   Follow up 3 months    Scribe's attestation:  This note was scribed in

## 2021-06-30 NOTE — TELEPHONE ENCOUNTER
Called pt to follow up on his referral to pulmonology, he no showed his first appointment with them.  Does he plan to reschedule or not move forward with this referral?

## 2021-07-02 ENCOUNTER — OFFICE VISIT (OUTPATIENT)
Dept: CARDIOLOGY CLINIC | Age: 41
End: 2021-07-02
Payer: COMMERCIAL

## 2021-07-02 VITALS
HEART RATE: 90 BPM | SYSTOLIC BLOOD PRESSURE: 128 MMHG | BODY MASS INDEX: 39.17 KG/M2 | WEIGHT: 315 LBS | DIASTOLIC BLOOD PRESSURE: 74 MMHG | HEIGHT: 75 IN | OXYGEN SATURATION: 98 %

## 2021-07-02 DIAGNOSIS — I10 HYPERTENSION, UNSPECIFIED TYPE: ICD-10-CM

## 2021-07-02 DIAGNOSIS — I25.10 CORONARY ARTERY DISEASE INVOLVING NATIVE CORONARY ARTERY OF NATIVE HEART, UNSPECIFIED WHETHER ANGINA PRESENT: Primary | ICD-10-CM

## 2021-07-02 DIAGNOSIS — I27.20 PULMONARY HTN (HCC): ICD-10-CM

## 2021-07-02 DIAGNOSIS — I51.7 MILD CONCENTRIC LEFT VENTRICULAR HYPERTROPHY (LVH): ICD-10-CM

## 2021-07-02 PROCEDURE — 99214 OFFICE O/P EST MOD 30 MIN: CPT | Performed by: INTERNAL MEDICINE

## 2021-07-02 PROCEDURE — G8417 CALC BMI ABV UP PARAM F/U: HCPCS | Performed by: INTERNAL MEDICINE

## 2021-07-02 PROCEDURE — G8427 DOCREV CUR MEDS BY ELIG CLIN: HCPCS | Performed by: INTERNAL MEDICINE

## 2021-07-02 PROCEDURE — 1036F TOBACCO NON-USER: CPT | Performed by: INTERNAL MEDICINE

## 2021-07-02 RX ORDER — ROSUVASTATIN CALCIUM 10 MG/1
10 TABLET, COATED ORAL DAILY
Qty: 90 TABLET | Refills: 3 | Status: SHIPPED | OUTPATIENT
Start: 2021-07-02 | End: 2022-03-15

## 2021-07-02 NOTE — PATIENT INSTRUCTIONS
Okay to stop atorvastatin  Recommend trying Crestor 10 mg daily  If you do not tolerate Crestor then we can try Zetia or healthier diet  Repeat FLP and CMP in two months  Continue current plan for blood pressure  Continue to monitor BP at home  Healthy lifestyle discussed  Follow up 3 months

## 2021-07-02 NOTE — LETTER
[address]   Cardiac Consultation    Referring Provider:  NAKUL Lang     Chief Complaint   Patient presents with    Follow-up    Hypertension    Coronary Artery Disease        History of Present Illness:    Celia Duarte is a 46 y.o. male who is here today as  follow up for hypertension. He presented to the ER at [address] in 1/14/2021 or tingling in his arms, dizziness, and a headache. Blood pressure 200's for SBP. Troponin's [de-identified]. CT of the head and neck were normal. There was concern for an acute stroke he was admitted to the hospital and underwent TAP infusion. MRI showed after wards showed no acute stroked. He continued to have a headache and a repeat CT of the head was normal. An echocardiogram from 1/15/2021 showed an EF of 55-60%, moderate to severe LVH. He returned to the ER at [facility] on 1/17/2021 with tingling in both hands and feet. He underwent cardiac cath on 6/8/2021 that demonstrated mild nonobstructive CAD and mildly elevated LVEDP. Today, he reports that he has been doing well. Notes no problems with wrist IV site. He checks BP at home and systolic is ranging 723-039. He did stop the statin. He reports that is made him feel bad, muscle aches. He started feeling better once he stopped. He has been watching his diet and decreasing portion sizes. He only drinks water. Patient denies chest pain, sob, palpitations, dizziness or syncope. He continues to power lift. He has started riding stationary bike.     Past Medical History:   has a past medical history of Chicken pox, Closed fracture of arm, Concussion, Concussion, Coronary artery disease involving native coronary artery of native heart, Decorative tattoo, Fall, GERD (gastroesophageal reflux disease), Hormone replacement therapy, Hypertension, Obese, XAVIER treated with BiPAP, Pericarditis, Pneumonia, Prolonged emergence from general anesthesia, TBI (traumatic brain injury) ([facility] [address] and Umbilical hernia. Surgical History:   has a past surgical history that includes Cholecystectomy (2007 (?)); Tonsillectomy and Adenoidectomy; Tympanostomy tube placement; [place] tooth extraction; Hernia repair (10/14/2016); hernia repair (10/14/2016); bronchoscopy (02/13/2016); and lymph node biopsy (Left, 8/11/2020). Social History:   reports that he quit smoking about 5 years ago. His smoking use included cigars. He has never used smokeless tobacco. He reports previous alcohol use. He reports current drug use. Drug: Marijuana. Family History:  family history includes Bipolar Disorder in his mother and another family member; Cancer in his father and maternal grandfather; Depression in his paternal grandmother; Heart Disease in his maternal aunt; Other in his father and mother. Home Medications:  Prior to Admission medications    Medication Sig Start Date End Date Taking? Authorizing Provider   lisinopril (PRINIVIL;ZESTRIL) 40 MG tablet Take 1 tablet by mouth daily 4/21/21  Yes Germania Steven MD   atorvastatin (LIPITOR) 40 MG tablet Take 1 tablet by mouth daily 2/26/21  Yes NAKUL Dale   aspirin 81 MG chewable tablet Take 81 mg by mouth 1/17/21  Yes Historical Provider, MD   amLODIPine (NORVASC) 10 MG tablet Take 1 tablet by mouth nightly TAKE ONE TABLET BY MOUTH DAILY 1/22/21  Yes NAKUL Dale   TESTOSTERONE AQUEOUS IM Inject into the skin three times a week Obtains off the internet   Yes Historical Provider, MD   pantoprazole (PROTONIX) 40 MG tablet Take 40 mg by mouth 2 times daily  4/26/19  Yes Historical Provider, MD   Multiple Vitamin (MULTIVITAMIN PO) Take  by mouth.      Yes Historical Provider, MD   ferrous sulfate (FE TABS) 325 (65 Fe) MG EC tablet Take 1 tablet by mouth daily (with breakfast)  Patient not taking: Reported on 7/2/2021 8/4/20   NAKUL Dale        Allergies:  Prednisone and Rocephin [ceftriaxone]     Review of Systems:   · Constitutional: there has been no unanticipated weight loss. There's been no change in energy level, sleep pattern, or activity level. · Eyes: No visual changes or diplopia. No scleral icterus. · ENT: No Headaches, hearing loss or vertigo. No mouth sores or sore throat. · Cardiovascular: Reviewed in HPI  · Respiratory: No cough or wheezing, no sputum production. No hematemesis. · Gastrointestinal: No abdominal pain, appetite loss, blood in stools. No change in bowel or bladder habits. · Genitourinary: No dysuria, trouble voiding, or hematuria. · Musculoskeletal:  No gait disturbance, weakness or joint complaints. · Integumentary: No rash or pruritis. · Neurological: No headache, diplopia, change in muscle strength, numbness or tingling. No change in gait, balance, coordination, mood, affect, memory, mentation, behavior. · Psychiatric: No anxiety, no depression. · Endocrine: No malaise, fatigue or temperature intolerance. No excessive thirst, fluid intake, or urination. No tremor. · Hematologic/Lymphatic: No abnormal bruising or bleeding, blood clots or swollen lymph nodes. · Allergic/Immunologic: No nasal congestion or hives. Physical Examination:    Vitals:    07/02/21 0859   BP: 128/74   Pulse: 90   SpO2: 98%        Constitutional and General Appearance: NAD   Respiratory:  · Normal excursion and expansion without use of accessory muscles  · Resp Auscultation: Normal breath sounds without dullness  Cardiovascular:  · The apical impulses not displaced  · Heart tones are crisp and normal  · Cervical veins are not engorged  · The carotid upstroke is normal in amplitude and contour without delay or bruit  · Normal S1S2, No S3, No Murmur  · Peripheral pulses are symmetrical and full  · There is no clubbing, cyanosis of the extremities.   · No edema  · Femoral Arteries: 2+ and equal  · Pedal Pulses: 2+ and equal   Abdomen:  · No masses or tenderness  · Liver/Spleen: No Abnormalities Noted  Neurological/Psychiatric:  · Alert and oriented in all spheres  · Moves all extremities well  · Exhibits normal gait balance and coordination  · No abnormalities of mood, affect, memory, mentation, or behavior are noted    Cardiac Cath 6/8/2012  Findings:   1. Hemodynamics:      A. Opening arterial pressure: 104/72 (84) mmHg       B. LVEDP: 17 mmHg     2. Coronary anatomy:   A. Left main artery: The left main artery bifurcates into the   left anterior descending artery and left circumflex artery. The   left main artery has minor luminal irregularities. B. Left anterior descending artery: Transapical vessel which   gives rise to 2 diagonal arteries. The proximal LAD is ectatic   and has mild disease. The mid-LAD has a 20% stenosis. The   distal LAD tapers to a relatively small caliber vessel and has   minor luminal irregularities. The diagonal arteries have minor   luminal irregularities. C. Left circumflex artery: Non-dominant vessel that gives rise to   2 obtuse marginal arteries. The LCx has a 20% ostial stenosis. The remainder of the vessel has minor luminal irregularities. The OM1 is a large vessel with minor luminal irregularities. The   OM2 is a small vessel with minor luminal irregularities. D. Right coronary artery: Very large, dominant vessel that gives   rise to the posterior descending artery and posterolateral   branch. The RCA has minor luminal irregularities. The posterior   descending artery has a 30% stenosis in the mid-segment. The PLB   has minor luminal irregularities. Impression:   1. Mild non-obstructive coronary artery disease. 2. Suspect mild troponin elevation at outside hospital was likely   secondary to type 2 demand ischemia in setting of hypertensive   urgency. 3. Mildly elevated LVEDP. Plan:   1. Continue aspirin 81 mg daily and atorvastatin 40 mg daily. 2. Continue lisinopril 40 mg daily and amlodipine 10 mg daily. 3. Optimal medical therapy for CAD risk factors.    4. Follow-up with [facility] in one     EKG 6/8/21  NSR 75 bpm    Nuclear Stress Test 6/1/2021  Summary Low normal LVEF 59%  Inferior hypokinesis  Mixed inferior ischemia/scar  Cannot exclude basal anterior wall scar   Overall, this would be considered an abnormal, high risk, study       Cardiac MRI: Aug 2017  1. There is no evidence of ventricular interdependence.     2. The left ventricular size is normal. The left ventricular ejection fraction is 62 % by Duron's method. Global left ventricular function is normal. There are no regional wall motion  abnormalities of the left ventricular wall. The left ventricular mass is mildly increased consistent with mild left ventricular hypertrophy. Manju Tanisha is no evidence of myocardial edema by  T2 weighted imaging (myocardium/skeletal muscle ratio 89/69, normal < 1.9).  High cardiac output is consistent with a hypermetabolic state. 3. The right ventricular size is normal. Global right ventricular function is normal. There are  no regional wall motion abnormalities of the right ventricular wall. 4. Left atrial size is mildly enlarged. Right atrial size is normal.  The Qp/Qs is normal measuring 1.1 by phase contrast imaging. 5. Peak velocity at the aortic valve is 1.27 corresponding to a peak gradient of 6.45 mmHg. The calculated aortic regurgitant fraction is 0.67 %. There is moderate mitral regurgitation. The calculated mitral regurgitant fraction is 28.17 %. There is moderate tricuspid regurgitation. The calculated tricuspid regurgitant fraction is 23.95 %. Peak velocity at pulmonary valve is 1.22 corresponding to a peak gradient of 5.95 mmHg. The calculated pulmonic regurgitant fraction is 1.73 %. 6. The main pulmonary artery is dilated measuring 37.0 mm. 7. Mediastinal and axillary adenopathy is noted. Cardiac MRI 2017  IMPRESSIONS:    No evidence of constrictive physiology. Left heart findings are suggestive of hypertensive heart disease.   High resting cardiac output is consistent with a hypermetabolic state. The pulmonary artery is dilated. Patient became anxious and was unable to complete contrasted portion of study.  Consider rescheduling with anxiolytic if concern for underlying cardiomyopathy. Echocardiogram 7/18/17  Summary   Normal LV systolic function with an estimated LVEF of 55%. MIld concentric left ventricular hypertrophy. Normal left ventricular diastolic filling pressure. Systolic pulmonary artery pressure (SPAP) is estimated at 41mmHg consistent   with mild pulmonary hypertension (RA pressure 3mmHg). EKG 6/13/2020  Normal sinus rhythm  Nonspecific ST abnormality  Abnormal ECG    Echocardiogram 1/15/2021  Summary:  The left ventricle is normal in size. There is moderate to severe concentric left ventricular hypertrophy. Left ventricular systolic function is normal. (LVEF>/=55%)  Overall left ventricular ejection fraction is estimated to be 55-60%. The diastolic function is impaired and classified as Grade 2  (psuedonormalization). Assessment:   Mild non-obstructive CAD - stable w/o angina  Elevated trop 1/2021 at [facility] which was believed due to hypertensive emergency. Hypertension - now controlled   Hyperlipidemia - not sig increased but w/ CAD will benefit from statin and reduced LDL. Did not tolerate lipitor. Willing to try a lower dose different statin   Moderate mitral regurgitation  Moderate tricuspid regurgitation  Moderate to severe concentric left ventricular hypertrophy. Plan:  Recommend trying Crestor 10 mg daily  If you do not tolerate Crestor then we can try Zetia   Repeat FLP and CMP in two months  Cardiac medications reviewed including indications and pertinent side effects    Check blood pressure and heart rate at home a few times per week- keep a log with dates and times and bring to office visit   Regular exercise and following a healthy diet encouraged   Follow up 3 months    Scribe's attestation:  This note was scribed in the presence of Pastor Christianne ENRIQUEZ by Markel Fernandes RN. The scribes documentation has been prepared under my direction and personally reviewed by me in its entirety. I confirm that the note above accurately reflects all work, treatment, procedures, and medical decision making performed by me. Dr. Pastor Christianne MD    Thank you for allowing me to participate in the care of this individual.      Pérez Wooten.  Liliya Dixon M.D., Ira Olivares

## 2021-07-24 ENCOUNTER — HOSPITAL ENCOUNTER (OUTPATIENT)
Age: 41
Discharge: HOME | End: 2021-07-24
Payer: COMMERCIAL

## 2021-07-24 ENCOUNTER — HOSPITAL ENCOUNTER (OUTPATIENT)
Dept: CT IMAGING | Age: 41
Discharge: HOME | End: 2021-07-24
Payer: COMMERCIAL

## 2021-07-24 DIAGNOSIS — R10.9 ABDOMINAL PAIN, UNSPECIFIED ABDOMINAL LOCATION: ICD-10-CM

## 2021-07-24 LAB
BUN BLDV-MCNC: 19 MG/DL (ref 7–20)
CREAT SERPL-MCNC: 1.1 MG/DL (ref 0.9–1.3)
GFR AFRICAN AMERICAN: >60
GFR NON-AFRICAN AMERICAN: >60

## 2021-07-24 PROCEDURE — 36415 COLL VENOUS BLD VENIPUNCTURE: CPT

## 2021-07-24 PROCEDURE — 82565 ASSAY OF CREATININE: CPT

## 2021-07-24 PROCEDURE — 74177 CT ABD & PELVIS W/CONTRAST: CPT

## 2021-07-24 PROCEDURE — 84520 ASSAY OF UREA NITROGEN: CPT

## 2021-07-24 PROCEDURE — 6360000004 HC RX CONTRAST MEDICATION: Performed by: INTERNAL MEDICINE

## 2021-07-24 RX ADMIN — IOHEXOL 50 ML: 240 INJECTION, SOLUTION INTRATHECAL; INTRAVASCULAR; INTRAVENOUS; ORAL at 10:25

## 2021-08-31 DIAGNOSIS — I10 ESSENTIAL HYPERTENSION: ICD-10-CM

## 2021-08-31 RX ORDER — AMLODIPINE BESYLATE 10 MG/1
TABLET ORAL
Qty: 90 TABLET | Refills: 0 | Status: SHIPPED | OUTPATIENT
Start: 2021-08-31 | End: 2021-11-29

## 2021-08-31 NOTE — TELEPHONE ENCOUNTER
[facility] [phone] (home)    is requesting refill(s) of medication Amlodipine to preferred pharmacy [facility]22 1/22/21 vv. (pertaining to medication)   Last refill 1/22/21 (per medication requested)  Next office visit scheduled or attempted No  Date   If No, reason Do you want pt to come back now?

## 2021-11-27 ENCOUNTER — TELEPHONE (OUTPATIENT)
Dept: FAMILY MEDICINE CLINIC | Age: 41
End: 2021-11-27

## 2021-11-27 DIAGNOSIS — I10 ESSENTIAL HYPERTENSION: ICD-10-CM

## 2021-11-29 RX ORDER — AMLODIPINE BESYLATE 10 MG/1
TABLET ORAL
Qty: 30 TABLET | Refills: 0 | Status: SHIPPED | OUTPATIENT
Start: 2021-11-29 | End: 2022-01-03

## 2021-11-29 NOTE — TELEPHONE ENCOUNTER
[facility] [phone] (home)    is requesting refill(s) of medication Amlodipine to preferred pharmacy [facility] 5422 1/22/21 (pertaining to medication)   Last refill 8/31/21 (per medication requested)  Next office visit scheduled or attempted No  Date   If No, reason Pt is overdue. Was due in September. Phone goes straight to .  Left  x 2

## 2022-01-03 DIAGNOSIS — I10 ESSENTIAL HYPERTENSION: ICD-10-CM

## 2022-01-03 RX ORDER — AMLODIPINE BESYLATE 10 MG/1
TABLET ORAL
Qty: 15 TABLET | Refills: 0 | Status: SHIPPED | OUTPATIENT
Start: 2022-01-03 | End: 2022-03-15 | Stop reason: SDUPTHER

## 2022-01-03 NOTE — LETTER
Liz Whiteto [address] [facility]  Dept: [phone]  Dept Fax: [phone]: [phone]   Toll Free [phone]    [address] [address]          1/4/2022    Dear Mr. Elliott Fly: We were unable to reach you by phone. Please call our office at your earliest convenience. Please call between the hours of 8:30am and 4:00pm Monday through Friday if possible. Our number is [phone]. Thank you.

## 2022-03-15 ENCOUNTER — OFFICE VISIT (OUTPATIENT)
Dept: FAMILY MEDICINE CLINIC | Age: 42
End: 2022-03-15
Payer: COMMERCIAL

## 2022-03-15 VITALS
HEART RATE: 81 BPM | SYSTOLIC BLOOD PRESSURE: 144 MMHG | BODY MASS INDEX: 39.17 KG/M2 | DIASTOLIC BLOOD PRESSURE: 82 MMHG | OXYGEN SATURATION: 97 % | HEIGHT: 75 IN | WEIGHT: 315 LBS

## 2022-03-15 DIAGNOSIS — I27.20 PULMONARY HTN (HCC): ICD-10-CM

## 2022-03-15 DIAGNOSIS — F07.81 CTE (CHRONIC TRAUMATIC ENCEPHALOPATHY): Primary | ICD-10-CM

## 2022-03-15 PROCEDURE — 1036F TOBACCO NON-USER: CPT | Performed by: PHYSICIAN ASSISTANT

## 2022-03-15 PROCEDURE — G8427 DOCREV CUR MEDS BY ELIG CLIN: HCPCS | Performed by: PHYSICIAN ASSISTANT

## 2022-03-15 PROCEDURE — G8417 CALC BMI ABV UP PARAM F/U: HCPCS | Performed by: PHYSICIAN ASSISTANT

## 2022-03-15 PROCEDURE — 99213 OFFICE O/P EST LOW 20 MIN: CPT | Performed by: PHYSICIAN ASSISTANT

## 2022-03-15 PROCEDURE — G8484 FLU IMMUNIZE NO ADMIN: HCPCS | Performed by: PHYSICIAN ASSISTANT

## 2022-03-15 RX ORDER — LANSOPRAZOLE 30 MG/1
CAPSULE, DELAYED RELEASE ORAL
COMMUNITY
Start: 2022-02-28

## 2022-03-15 RX ORDER — AMLODIPINE BESYLATE 10 MG/1
TABLET ORAL
Qty: 30 TABLET | Refills: 3 | Status: SHIPPED | OUTPATIENT
Start: 2022-03-15 | End: 2022-08-03

## 2022-03-15 ASSESSMENT — ENCOUNTER SYMPTOMS
RESPIRATORY NEGATIVE: 1
BACK PAIN: 1

## 2022-03-15 ASSESSMENT — PATIENT HEALTH QUESTIONNAIRE - PHQ9
SUM OF ALL RESPONSES TO PHQ QUESTIONS 1-9: 0
SUM OF ALL RESPONSES TO PHQ QUESTIONS 1-9: 0
SUM OF ALL RESPONSES TO PHQ9 QUESTIONS 1 & 2: 0
1. LITTLE INTEREST OR PLEASURE IN DOING THINGS: 0
SUM OF ALL RESPONSES TO PHQ QUESTIONS 1-9: 0
SUM OF ALL RESPONSES TO PHQ QUESTIONS 1-9: 0
2. FEELING DOWN, DEPRESSED OR HOPELESS: 0

## 2022-03-15 NOTE — PROGRESS NOTES
Subjective:      Patient ID: Tammy Persaud is a 46 y.o. male. HPI  Patient presents today with a few concerns. Has noticed muscular weakness, feels like legs in concrete when he gets up. Not able to lift like he used to. Feeling very fatigued. For the last 2 years has had periods intermittently where he in unable to much physical activity at all then will be fine for periods. Intermittent low back pain. After travel will come home and will have to sleep for 2-3 days. Memory concerns- missing paying bills. History of prior concussions- dx with presumed CTE at one point. Has anger and anxiety issues. Information given by patient and wife. They are concerned with MS. Would like a second opinion by neurology as symptoms are worsening. Review of Systems   Constitutional: Positive for fatigue. Respiratory: Negative. Cardiovascular: Negative. Musculoskeletal: Positive for back pain. Neurological: Positive for weakness. Psychiatric/Behavioral:        Mood lability       Objective:   Physical Exam  Constitutional:       Appearance: Normal appearance. Cardiovascular:      Rate and Rhythm: Normal rate and regular rhythm. Heart sounds: Normal heart sounds. Pulmonary:      Effort: Pulmonary effort is normal.      Breath sounds: Normal breath sounds. Neurological:      General: No focal deficit present. Mental Status: He is alert and oriented to person, place, and time. Assessment / Plan:       Diagnosis Orders   1. CTE (chronic traumatic encephalopathy)  External Referral To Neurology   2. Pulmonary HTN ([address]  Followed by Dr. Abraham Cranker- stable on lisinopril and norvasc        Referral to  neurotrauma center.

## 2022-03-16 ENCOUNTER — TELEPHONE (OUTPATIENT)
Dept: FAMILY MEDICINE CLINIC | Age: 42
End: 2022-03-16

## 2022-03-16 DIAGNOSIS — R41.3 MEMORY CHANGES: ICD-10-CM

## 2022-03-16 DIAGNOSIS — F07.81 CTE (CHRONIC TRAUMATIC ENCEPHALOPATHY): Primary | ICD-10-CM

## 2022-03-16 NOTE — TELEPHONE ENCOUNTER
Patient said he called neurology and they scheduled him but are requiring a recent MRI. He said he knows Mamtajuancarlos Ronnell is covered under his insurance so would like an order for an MRI sent to the [facility] in [facility]. He does not have the fax number for them. I did tell him that Jaja Mendenhall is out of the office until Friday but he is hoping that this order can be placed asap.

## 2022-03-18 NOTE — TELEPHONE ENCOUNTER
Please let patient know I ordered MRI-  I would suggest going to [facility] for claustrophobia issues    Please fax order

## 2022-03-23 ENCOUNTER — TELEPHONE (OUTPATIENT)
Dept: FAMILY MEDICINE CLINIC | Age: 42
End: 2022-03-23

## 2022-03-23 DIAGNOSIS — R41.3 MEMORY CHANGES: ICD-10-CM

## 2022-03-23 DIAGNOSIS — F07.81 CTE (CHRONIC TRAUMATIC ENCEPHALOPATHY): Primary | ICD-10-CM

## 2022-03-23 DIAGNOSIS — I27.20 PULMONARY HTN (HCC): ICD-10-CM

## 2022-03-23 NOTE — TELEPHONE ENCOUNTER
Harsha Ángel called from [facility] regarding MRI of brain. She is requesting office notes to support the order. She is also requesting an update order for \"with and without\" contrast.  Fax # [phone].

## 2022-04-05 ENCOUNTER — PATIENT MESSAGE (OUTPATIENT)
Dept: FAMILY MEDICINE CLINIC | Age: 42
End: 2022-04-05

## 2022-04-05 NOTE — TELEPHONE ENCOUNTER
From: Sean Thomson  To: Rebecca Curry  Sent: 4/5/2022 12:46 PM EDT  Subject: Question    Jackelin Yan,  We are in a bit of a situation. I havent been paid from a project Im on and [employer] is going to shut us off because I cant pay the balance. They said with me being on a machine, that they can give us a break and keep the power on. They said it would require a note/fax message from my Dr stating Im on a bipap machine needed for sleep. Can you do this? Im trying to work all this out now.     Thanks,    [facility]  [phone]

## 2022-04-05 NOTE — LETTER
[address] [place], [place] [address] [address] [address]  Phone: [phone]  Fax: [phone]    Ruchi Nesbitt        April 5, 2022     Patient: Ally Quiles   YOB: 1980           To Whom it May Concern:    Robin Flaherty is an active patient at our practice. He is on a bi-pap machine at night that he is required to be on to help with breathing while he is asleep. He will need to have his electric on due to medical necessity. If you have any questions or concerns, please don't hesitate to call.     Sincerely,         NAKUL Nesbitt

## 2022-04-11 ENCOUNTER — TELEPHONE (OUTPATIENT)
Dept: FAMILY MEDICINE CLINIC | Age: 42
End: 2022-04-11

## 2022-04-11 NOTE — TELEPHONE ENCOUNTER
Iker Pi called from [address] [place] [address] regarding orders received for Brain MRI with contrast.  [facility] requires MRI of Brain to have with and without contrast.  New orders please with both. Fax number [phone]. Patient has an appointment for MRI tomorrow morning at 9:00 AM.  Elsi Perkins is out of the office until next week, but would see if other PA would write orders. Buffy?

## 2022-04-19 DIAGNOSIS — F07.81 CTE (CHRONIC TRAUMATIC ENCEPHALOPATHY): Primary | ICD-10-CM

## 2022-04-19 DIAGNOSIS — R46.89 BEHAVIORAL CHANGE: ICD-10-CM

## 2022-04-19 DIAGNOSIS — R41.3 MEMORY CHANGES: ICD-10-CM

## 2022-04-29 ENCOUNTER — TELEPHONE (OUTPATIENT)
Dept: FAMILY MEDICINE CLINIC | Age: 42
End: 2022-04-29

## 2022-04-29 DIAGNOSIS — I10 ESSENTIAL HYPERTENSION: Primary | ICD-10-CM

## 2022-04-29 NOTE — TELEPHONE ENCOUNTER
True Garza is asking that an order for Creatinine be placed. Patient has an MRI scheduled at [facility] on Tuesday and they do not do bloodwork there so he will have to get the blood work done on Monday, will most likely be going to [facility]. Please call True Garza at [facility] once the order is placed so she can call the patient and let him know when he is good to go out to the lab.

## 2022-05-02 LAB
CREAT SERPL-MCNC: 0.9 MG/DL (ref 0.9–1.3)
GFR AFRICAN AMERICAN: >60
GFR NON-AFRICAN AMERICAN: >60

## 2022-05-02 RX ORDER — LISINOPRIL 40 MG/1
40 TABLET ORAL DAILY
Qty: 90 TABLET | Refills: 3 | Status: SHIPPED | OUTPATIENT
Start: 2022-05-02

## 2022-05-02 NOTE — TELEPHONE ENCOUNTER
Spoke to the patient who states that he cannot go to [facility] because that is too far for him, he lives in [facility]. I advised that he can go to any [facility] lab however it has to be done today in order to get the MRI tomorrow.  He will go to the [facility] in [place], provided him the phone number to the lab so he could call them first

## 2022-05-03 ENCOUNTER — HOSPITAL ENCOUNTER (OUTPATIENT)
Dept: MRI IMAGING | Age: 42
Discharge: HOME | End: 2022-05-03
Payer: COMMERCIAL

## 2022-05-03 DIAGNOSIS — R41.3 MEMORY CHANGES: ICD-10-CM

## 2022-05-03 DIAGNOSIS — F07.81 CTE (CHRONIC TRAUMATIC ENCEPHALOPATHY): ICD-10-CM

## 2022-05-03 DIAGNOSIS — R46.89 BEHAVIORAL CHANGE: ICD-10-CM

## 2022-05-03 PROCEDURE — 70551 MRI BRAIN STEM W/O DYE: CPT

## 2022-06-24 ENCOUNTER — HOSPITAL ENCOUNTER (OUTPATIENT)
Age: 42
Discharge: HOME | End: 2022-06-24
Payer: COMMERCIAL

## 2022-06-24 LAB
C DIFF TOXIN/ANTIGEN: NORMAL
INR BLD: 1.13 (ref 0.87–1.14)
PROTHROMBIN TIME: 14.4 SEC (ref 11.7–14.5)

## 2022-06-24 PROCEDURE — 87449 NOS EACH ORGANISM AG IA: CPT

## 2022-06-24 PROCEDURE — 87328 CRYPTOSPORIDIUM AG IA: CPT

## 2022-06-24 PROCEDURE — 85610 PROTHROMBIN TIME: CPT

## 2022-06-24 PROCEDURE — 87324 CLOSTRIDIUM AG IA: CPT

## 2022-06-24 PROCEDURE — 83690 ASSAY OF LIPASE: CPT

## 2022-06-24 PROCEDURE — 85025 COMPLETE CBC W/AUTO DIFF WBC: CPT

## 2022-06-24 PROCEDURE — 36415 COLL VENOUS BLD VENIPUNCTURE: CPT

## 2022-06-24 PROCEDURE — 87505 NFCT AGENT DETECTION GI: CPT

## 2022-06-24 PROCEDURE — 83993 ASSAY FOR CALPROTECTIN FECAL: CPT

## 2022-06-24 PROCEDURE — 80053 COMPREHEN METABOLIC PANEL: CPT

## 2022-06-24 PROCEDURE — 87336 ENTAMOEB HIST DISPR AG IA: CPT

## 2022-06-25 LAB
A/G RATIO: 1.4 (ref 1.1–2.2)
ALBUMIN SERPL-MCNC: 4.1 G/DL (ref 3.4–5)
ALP BLD-CCNC: 78 U/L (ref 40–129)
ALT SERPL-CCNC: 44 U/L (ref 10–40)
ANION GAP SERPL CALCULATED.3IONS-SCNC: 12 MMOL/L (ref 3–16)
AST SERPL-CCNC: 24 U/L (ref 15–37)
BASOPHILS ABSOLUTE: 0.1 K/UL (ref 0–0.2)
BASOPHILS RELATIVE PERCENT: 1.2 %
BILIRUB SERPL-MCNC: 0.5 MG/DL (ref 0–1)
BUN BLDV-MCNC: 13 MG/DL (ref 7–20)
CALCIUM SERPL-MCNC: 8.8 MG/DL (ref 8.3–10.6)
CHLORIDE BLD-SCNC: 105 MMOL/L (ref 99–110)
CO2: 22 MMOL/L (ref 21–32)
CREAT SERPL-MCNC: 1.2 MG/DL (ref 0.9–1.3)
EOSINOPHILS ABSOLUTE: 0.2 K/UL (ref 0–0.6)
EOSINOPHILS RELATIVE PERCENT: 3 %
GFR AFRICAN AMERICAN: >60
GFR NON-AFRICAN AMERICAN: >60
GI BACTERIAL PATHOGENS BY PCR: NORMAL
GLUCOSE BLD-MCNC: 103 MG/DL (ref 70–99)
HCT VFR BLD CALC: 42.8 % (ref 40.5–52.5)
HEMOGLOBIN: 14.5 G/DL (ref 13.5–17.5)
LIPASE: 43 U/L (ref 13–60)
LYMPHOCYTES ABSOLUTE: 2.4 K/UL (ref 1–5.1)
LYMPHOCYTES RELATIVE PERCENT: 30.9 %
MCH RBC QN AUTO: 27.8 PG (ref 26–34)
MCHC RBC AUTO-ENTMCNC: 33.8 G/DL (ref 31–36)
MCV RBC AUTO: 82.3 FL (ref 80–100)
MONOCYTES ABSOLUTE: 0.6 K/UL (ref 0–1.3)
MONOCYTES RELATIVE PERCENT: 7.5 %
NEUTROPHILS ABSOLUTE: 4.4 K/UL (ref 1.7–7.7)
NEUTROPHILS RELATIVE PERCENT: 57.4 %
PDW BLD-RTO: 14.5 % (ref 12.4–15.4)
PLATELET # BLD: 233 K/UL (ref 135–450)
PMV BLD AUTO: 8.2 FL (ref 5–10.5)
POTASSIUM SERPL-SCNC: 4.2 MMOL/L (ref 3.5–5.1)
RBC # BLD: 5.19 M/UL (ref 4.2–5.9)
SODIUM BLD-SCNC: 139 MMOL/L (ref 136–145)
TOTAL PROTEIN: 7.1 G/DL (ref 6.4–8.2)
WBC # BLD: 7.7 K/UL (ref 4–11)

## 2022-06-26 LAB
CRYPTOSPORIDIUM ANTIGEN STOOL: NORMAL
E HISTOLYTICA ANTIGEN STOOL: NORMAL
GIARDIA ANTIGEN STOOL: NORMAL

## 2022-06-28 LAB — CALPROTECTIN, FECAL: 189 UG/G

## 2022-07-15 ENCOUNTER — TELEPHONE (OUTPATIENT)
Dept: FAMILY MEDICINE CLINIC | Age: 42
End: 2022-07-15

## 2022-07-15 NOTE — TELEPHONE ENCOUNTER
aLly from the hearing, speech and deaf center [facility] called in regards to paperwork that she sent on 07/11 and wants to make sure you filled out the paperwork with your part  I do not see anything scanned into chart     Please fax forms back to [phone]  Mik Collins can be reached at [phone]

## 2022-08-03 RX ORDER — AMLODIPINE BESYLATE 10 MG/1
TABLET ORAL
Qty: 30 TABLET | Refills: 0 | Status: SHIPPED | OUTPATIENT
Start: 2022-08-03 | End: 2022-09-06

## 2022-08-03 NOTE — TELEPHONE ENCOUNTER
José Banda is requesting refill(s) amlodipine  Last OV 3/15/22 (pertaining to medication)  LR 3/15/22 (per medication requested)  Next office visit scheduled or attempted No   If no, reason:  when do you want the pt to return?

## 2022-09-06 RX ORDER — AMLODIPINE BESYLATE 10 MG/1
TABLET ORAL
Qty: 30 TABLET | Refills: 2 | Status: SHIPPED | OUTPATIENT
Start: 2022-09-06

## 2022-11-08 ENCOUNTER — OFFICE VISIT (OUTPATIENT)
Dept: FAMILY MEDICINE CLINIC | Age: 42
End: 2022-11-08
Payer: COMMERCIAL

## 2022-11-08 VITALS
BODY MASS INDEX: 39.17 KG/M2 | HEIGHT: 75 IN | HEART RATE: 93 BPM | DIASTOLIC BLOOD PRESSURE: 118 MMHG | SYSTOLIC BLOOD PRESSURE: 176 MMHG | OXYGEN SATURATION: 97 % | WEIGHT: 315 LBS

## 2022-11-08 DIAGNOSIS — R05.1 ACUTE COUGH: ICD-10-CM

## 2022-11-08 DIAGNOSIS — I10 ESSENTIAL HYPERTENSION: Primary | ICD-10-CM

## 2022-11-08 PROCEDURE — G8484 FLU IMMUNIZE NO ADMIN: HCPCS | Performed by: PHYSICIAN ASSISTANT

## 2022-11-08 PROCEDURE — 3078F DIAST BP <80 MM HG: CPT | Performed by: PHYSICIAN ASSISTANT

## 2022-11-08 PROCEDURE — 3074F SYST BP LT 130 MM HG: CPT | Performed by: PHYSICIAN ASSISTANT

## 2022-11-08 PROCEDURE — 1036F TOBACCO NON-USER: CPT | Performed by: PHYSICIAN ASSISTANT

## 2022-11-08 PROCEDURE — G8427 DOCREV CUR MEDS BY ELIG CLIN: HCPCS | Performed by: PHYSICIAN ASSISTANT

## 2022-11-08 PROCEDURE — 99213 OFFICE O/P EST LOW 20 MIN: CPT | Performed by: PHYSICIAN ASSISTANT

## 2022-11-08 PROCEDURE — G8417 CALC BMI ABV UP PARAM F/U: HCPCS | Performed by: PHYSICIAN ASSISTANT

## 2022-11-08 RX ORDER — AMOXICILLIN AND CLAVULANATE POTASSIUM 875; 125 MG/1; MG/1
1 TABLET, FILM COATED ORAL 2 TIMES DAILY
COMMUNITY
Start: 2022-11-03 | End: 2022-11-13

## 2022-11-08 RX ORDER — GUAIFENESIN AND CODEINE PHOSPHATE 100; 10 MG/5ML; MG/5ML
5 SOLUTION ORAL 3 TIMES DAILY PRN
Qty: 90 ML | Refills: 0 | Status: SHIPPED | OUTPATIENT
Start: 2022-11-08 | End: 2022-11-15

## 2022-11-08 RX ORDER — BUDESONIDE, GLYCOPYRROLATE, AND FORMOTEROL FUMARATE 160; 9; 4.8 UG/1; UG/1; UG/1
1 AEROSOL, METERED RESPIRATORY (INHALATION) 2 TIMES DAILY
Qty: 1 EACH | Refills: 0 | COMMUNITY
Start: 2022-11-08

## 2022-11-08 ASSESSMENT — ENCOUNTER SYMPTOMS
COUGH: 1
SHORTNESS OF BREATH: 1

## 2022-11-08 NOTE — PROGRESS NOTES
Subjective:      Patient ID: Emmett Ziegler is a 38 y.o. male. HPI  Patient seen in ER 11/3. Diagnosed with bronchitis. Given augmentin and cough medication. Cough is intense, keeping him awake at night. He is otherwise feeling better. He has not taken his BP medication today. It was running high in the ER also. Has been taking otc cold medication with sudafed. Review of Systems   Constitutional: Negative. HENT: Negative. Respiratory:  Positive for cough and shortness of breath. Cardiovascular: Negative. Objective:   Physical Exam  Constitutional:       Appearance: Normal appearance. He is obese. Cardiovascular:      Rate and Rhythm: Normal rate and regular rhythm. Pulmonary:      Effort: Pulmonary effort is normal.      Breath sounds: Normal breath sounds. No wheezing. Neurological:      Mental Status: He is alert. Assessment / Plan:            Diagnosis Orders   1. Essential hypertension  To take BP medicaiton today and monitor, to stop otc cold medications. Call with results over the next few days. Will need to adjust medications if remains elevated.        2. Acute cough  guaiFENesin-codeine (TUSSI-ORGANIDIN NR) 100-10 MG/5ML syrup  Trail of short term use of breztri- sample given

## 2022-11-11 ENCOUNTER — TELEPHONE (OUTPATIENT)
Dept: FAMILY MEDICINE CLINIC | Age: 42
End: 2022-11-11

## 2022-11-11 NOTE — TELEPHONE ENCOUNTER
Patient was advised of physician's advise, he will continue to use the inhaler and take cough medication, he will follow up if no improvement and if he continues to see blood in his sputum

## 2022-11-11 NOTE — TELEPHONE ENCOUNTER
Patient saw [facility] on 11/8 and states he continues to have a lot of coughing, he states this morning he began coughing up bloody  mucous, he states it is both bright red at times and then other times just blood tinged mucous, he also says that after a hard cough he can taste the metallic taste of blood even when not visible. He is concerned and would like to know what Amina Levels recommends.

## 2022-11-14 ENCOUNTER — TELEPHONE (OUTPATIENT)
Dept: PULMONOLOGY | Age: 42
End: 2022-11-14

## 2022-11-14 DIAGNOSIS — G47.30 SEVERE SLEEP APNEA: Primary | ICD-10-CM

## 2022-11-14 NOTE — TELEPHONE ENCOUNTER
Patient calling stating he was just informed on the recall of his pap device he has registered device but wants to know what to do in the meantime. Pt did schedule appt next available 11/29    LOV: 10/11/17    Assessment:       Severe XAVIER. BiPAP 18/13 cmH2O. Full face mask. Dyspnea since 2014. Unclear etiology. Normal TSH and Hb. Negative D dimer. Normal MIP and MEP. R hemidiaphragm, reactive airway disease, obesity, pulm HTN, deconditioning and anxiety are contributing. Not able to CPET due to weight. 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 Negative bronch for EBL 2/13/2017   Abnormal CT chest 2/22/2017 with RLL patchy ASD- likely due to BAL done on Bronch. Resolved on repeat CT chest    Morbid obesity          Plan:       Albuterol 2 puffs Q4-6 hrs PRN  Trial of Symbicort 160/4.5 2 puffs BID   PFTs and 6MW   CPET   Advised to use BiPAP 6-8 hrs at night and during naps. Replacement of mask, tubing, head straps every 3-6 months or sooner if damaged. Follow up BIPAP compliance and pressure adjustment if needed  Sleep hygiene  Avoid sedatives, alcohol and caffeinated drinks at bed time. No driving motorized vehicles or operating heavy machinery while fatigue, drowsy or sleepy. Weight loss is also recommended as a long-term intervention. Complications of XAVIER if not treated were discussed with patient patient to include systemic hypertension, pulmonary hypertension, cardiovascular morbidities, car accidents and all cause mortality.

## 2022-11-14 NOTE — TELEPHONE ENCOUNTER
Pt called into office, relayed Dr Sondra Chong message. Pt has registered his device with [employer]. Confirmed DME is [employer]. Pt would like new bipap ordered.

## 2022-11-14 NOTE — TELEPHONE ENCOUNTER
There has been a recall on Anatoliy Julio over potential health risks related to foam degradation. Advise to avoid use of unproven cleaning methods, such as ozone. Alternative therapy may not exist or may be severely limited. Felt the benefits of continued usage of these device may outweigh the risks identified in the recall notification given severity and comorbid conditions. Make sure patient feels comfortable with this approach until further recommendations are issued and replacement is available  Advised to register machine on [address].    Order for replacement

## 2022-11-28 ENCOUNTER — TELEPHONE (OUTPATIENT)
Dept: PULMONOLOGY | Age: 42
End: 2022-11-28

## 2022-11-28 NOTE — TELEPHONE ENCOUNTER
Patient cancelled appointment on 11/29/22 with Dr. Palmer Brown for  XAVIER f/u per pt request last seen ataya 2017   Reason: pt cancelled via automated systemm    Patient did not reschedule appointment. Appointment rescheduled for na. Last OV       Assessment: 10/11/17      Severe XAVIER. BiPAP 18/13 cmH2O. Full face mask. Dyspnea since 2014. Unclear etiology. Normal TSH and Hb. Negative D dimer. Normal MIP and MEP. R hemidiaphragm, reactive airway disease, obesity, pulm HTN, deconditioning and anxiety are contributing. Not able to CPET due to weight. 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 Negative bronch for EBL 2/13/2017   Abnormal CT chest 2/22/2017 with RLL patchy ASD- likely due to BAL done on Bronch. Resolved on repeat CT chest    Morbid obesity          Plan:       Albuterol 2 puffs Q4-6 hrs PRN  Trial of Symbicort 160/4.5 2 puffs BID   PFTs and 6MW   CPET   Advised to use BiPAP 6-8 hrs at night and during naps. Replacement of mask, tubing, head straps every 3-6 months or sooner if damaged. Follow up BIPAP compliance and pressure adjustment if needed  Sleep hygiene  Avoid sedatives, alcohol and caffeinated drinks at bed time. No driving motorized vehicles or operating heavy machinery while fatigue, drowsy or sleepy. Weight loss is also recommended as a long-term intervention. Complications of XAVIER if not treated were discussed with patient patient to include systemic hypertension, pulmonary hypertension, cardiovascular morbidities, car accidents and all cause mortality.

## 2023-01-11 RX ORDER — AMLODIPINE BESYLATE 10 MG/1
TABLET ORAL
Qty: 30 TABLET | Refills: 2 | OUTPATIENT
Start: 2023-01-11

## 2023-01-11 NOTE — TELEPHONE ENCOUNTER
Monica Batista is requesting refill(s) amlodipine  Last OV 11/8/22 (pertaining to medication)  LR 9/6/22 (per medication requested)  Next office visit scheduled or attempted Yes   If no, reason:  LM pt is due in May

## 2023-01-12 RX ORDER — AMLODIPINE BESYLATE 10 MG/1
TABLET ORAL
Qty: 30 TABLET | Refills: 2 | Status: SHIPPED | OUTPATIENT
Start: 2023-01-12

## 2023-01-23 ENCOUNTER — TELEPHONE (OUTPATIENT)
Dept: FAMILY MEDICINE CLINIC | Age: 43
End: 2023-01-23

## 2023-01-23 NOTE — TELEPHONE ENCOUNTER
Spoke with wife and patient. Patient to ER last night for syncopal episode. They are trying to schedule with  neurology which was recommended months ago and follow up fell through. ER notes reviewed. Wife reports continued episodes of confusion and memory issus this is not new since the fall this is ongoing Short term memory is the issues. The last 18 months will episodic periods of 1-2 months where patient will have extreme weakness and lethargy and is barely able to physically function. The last 1-2 weeks has had urinary incontinence at night time. Discussed should have OV to check urine and PSA. To call if having issues getting in with  neurotrauma department.

## 2023-01-23 NOTE — TELEPHONE ENCOUNTER
Pt is requesting a call back from Satsuma. He states that he fell last night, hit his head, and went to the ER. He states that he is wanting to go over some things with Satsuma.

## 2023-02-07 ENCOUNTER — TELEPHONE (OUTPATIENT)
Dept: PULMONOLOGY | Age: 43
End: 2023-02-07

## 2023-02-07 NOTE — TELEPHONE ENCOUNTER
Patient cancelled appointment on 2/7/2023 with dr. Zita Aschoff for XAVIER f/u per pt request last seen ataya 2017 CR scanned . Reason: pt is out of town for work    Patient did reschedule appointment. Appointment rescheduled for 3/9/2023. Last OV  10/11/2017    Assessment:       Severe XAVIER. BiPAP 18/13 cmH2O. Full face mask. Dyspnea since 2014. Unclear etiology. Normal TSH and Hb. Negative D dimer. Normal MIP and MEP. R hemidiaphragm, reactive airway disease, obesity, pulm HTN, deconditioning and anxiety are contributing. Not able to CPET due to weight. 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 Negative bronch for EBL 2/13/2017   Abnormal CT chest 2/22/2017 with RLL patchy ASD- likely due to BAL done on Bronch. Resolved on repeat CT chest    Morbid obesity          Plan:       Albuterol 2 puffs Q4-6 hrs PRN  Trial of Symbicort 160/4.5 2 puffs BID   PFTs and 6MW   CPET   Advised to use BiPAP 6-8 hrs at night and during naps. Replacement of mask, tubing, head straps every 3-6 months or sooner if damaged. Follow up BIPAP compliance and pressure adjustment if needed  Sleep hygiene  Avoid sedatives, alcohol and caffeinated drinks at bed time. No driving motorized vehicles or operating heavy machinery while fatigue, drowsy or sleepy. Weight loss is also recommended as a long-term intervention.     Complications of XAVIER if not treated were discussed with patient patient to include systemic hypertension, pulmonary hypertension, cardiovascular morbidities, car accidents and all cause mortality

## 2023-03-02 ENCOUNTER — TELEPHONE (OUTPATIENT)
Dept: PULMONOLOGY | Age: 43
End: 2023-03-02

## 2023-03-02 NOTE — TELEPHONE ENCOUNTER
Patient did not show for XAVIER f/u per pt request last seen Ataya 2017 CR scanned appointment  with Dr. Janee Betts on 3/2/23    Same Day Cancellation: No    Patient rescheduled:  No    New appointment: NA    Patient was also no show on: 6/10/21    LOV 10/11/17  Assessment:       Severe XAVIER. BiPAP 18/13 cmH2O. Full face mask. Dyspnea since 2014. Unclear etiology. Normal TSH and Hb. Negative D dimer. Normal MIP and MEP. R hemidiaphragm, reactive airway disease, obesity, pulm HTN, deconditioning and anxiety are contributing. Not able to CPET due to weight. 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 Negative bronch for EBL 2/13/2017   Abnormal CT chest 2/22/2017 with RLL patchy ASD- likely due to BAL done on Bronch. Resolved on repeat CT chest    Morbid obesity          Plan:       Albuterol 2 puffs Q4-6 hrs PRN  Trial of Symbicort 160/4.5 2 puffs BID   PFTs and 6MW   CPET   Advised to use BiPAP 6-8 hrs at night and during naps. Replacement of mask, tubing, head straps every 3-6 months or sooner if damaged. Follow up BIPAP compliance and pressure adjustment if needed  Sleep hygiene  Avoid sedatives, alcohol and caffeinated drinks at bed time. No driving motorized vehicles or operating heavy machinery while fatigue, drowsy or sleepy. Weight loss is also recommended as a long-term intervention. Complications of XAVIER if not treated were discussed with patient patient to include systemic hypertension, pulmonary hypertension, cardiovascular morbidities, car accidents and all cause mortality.

## 2023-03-21 ENCOUNTER — TELEPHONE (OUTPATIENT)
Dept: FAMILY MEDICINE CLINIC | Age: 43
End: 2023-03-21

## 2023-03-21 RX ORDER — DOXYCYCLINE HYCLATE 100 MG
100 TABLET ORAL 2 TIMES DAILY
Qty: 28 TABLET | Refills: 0 | Status: SHIPPED | OUTPATIENT
Start: 2023-03-21 | End: 2023-04-04

## 2023-03-21 NOTE — TELEPHONE ENCOUNTER
Pt called the office and wants to speak with Rubi Dial.  He went to a [facility] urgent care for STD testing and wants to discuss these results with you

## 2023-03-21 NOTE — PROGRESS NOTES
Spoke with patient, was seen at [facility] urgent care dx with syphyllis, herpes still pending. Will start doxy 100mg for 14 days retest titers in 6 and 12 months.

## 2023-03-22 NOTE — TELEPHONE ENCOUNTER
Patient calling to let Sonido know that he got a second test result back this morning it was positive for herpes. He will be needing additional prescriptions in the future for this as well.

## 2023-05-09 ENCOUNTER — TELEPHONE (OUTPATIENT)
Dept: CARDIOLOGY CLINIC | Age: 43
End: 2023-05-09

## 2023-07-03 NOTE — TELEPHONE ENCOUNTER
7/2/2021 [facility]  No upcoming appt    Please contact pt for yearly appt and route back.

## 2023-07-03 NOTE — TELEPHONE ENCOUNTER
7/3 called ([phone]) unable to make contact with pt. LM for pt to call [facility] to schedule appt. Will attempt to reach pt at a later time.

## 2023-07-05 NOTE — TELEPHONE ENCOUNTER
7/5 called ([phone]) unable to make contact with pt. LM for pt to call [facility] to schedule appointment. After several attempts to reach pt a letter has been created and mailed to the address on file for pt.

## 2023-07-06 RX ORDER — LISINOPRIL 40 MG/1
40 TABLET ORAL DAILY
Qty: 90 TABLET | Refills: 0 | Status: SHIPPED | OUTPATIENT
Start: 2023-07-06

## 2023-07-26 NOTE — TELEPHONE ENCOUNTER
Devaughn Cruzino [phone] (home)    is requesting refill(s) of medication Amlodipine to preferred pharmacy [address] [place] [address] Rd 11/8/22 (pertaining to medication)   Last refill 1/12/23 (per medication requested)  Next office visit scheduled or attempted No  Date   If No, reason Left vm for pt to return call.  He was due in May

## 2023-07-27 RX ORDER — AMLODIPINE BESYLATE 10 MG/1
10 TABLET ORAL NIGHTLY
Qty: 30 TABLET | Refills: 0 | Status: SHIPPED | OUTPATIENT
Start: 2023-07-27

## 2023-08-07 RX ORDER — VALACYCLOVIR HYDROCHLORIDE 1 G/1
1000 TABLET, FILM COATED ORAL 2 TIMES DAILY
Qty: 14 TABLET | Refills: 1 | Status: SHIPPED | OUTPATIENT
Start: 2023-08-07 | End: 2023-08-10 | Stop reason: SDUPTHER

## 2023-08-10 ENCOUNTER — OFFICE VISIT (OUTPATIENT)
Dept: FAMILY MEDICINE CLINIC | Age: 43
End: 2023-08-10
Payer: COMMERCIAL

## 2023-08-10 VITALS
DIASTOLIC BLOOD PRESSURE: 78 MMHG | SYSTOLIC BLOOD PRESSURE: 134 MMHG | HEART RATE: 90 BPM | HEIGHT: 75 IN | BODY MASS INDEX: 39.17 KG/M2 | OXYGEN SATURATION: 98 % | WEIGHT: 315 LBS

## 2023-08-10 DIAGNOSIS — I27.20 PULMONARY HTN (HCC): ICD-10-CM

## 2023-08-10 DIAGNOSIS — R73.09 ELEVATED GLUCOSE: Primary | ICD-10-CM

## 2023-08-10 DIAGNOSIS — E11.65 TYPE 2 DIABETES MELLITUS WITH HYPERGLYCEMIA, WITHOUT LONG-TERM CURRENT USE OF INSULIN (HCC): ICD-10-CM

## 2023-08-10 LAB — HBA1C MFR BLD: 7.1 %

## 2023-08-10 PROCEDURE — 99214 OFFICE O/P EST MOD 30 MIN: CPT | Performed by: PHYSICIAN ASSISTANT

## 2023-08-10 PROCEDURE — 1036F TOBACCO NON-USER: CPT | Performed by: PHYSICIAN ASSISTANT

## 2023-08-10 PROCEDURE — 2022F DILAT RTA XM EVC RTNOPTHY: CPT | Performed by: PHYSICIAN ASSISTANT

## 2023-08-10 PROCEDURE — G8427 DOCREV CUR MEDS BY ELIG CLIN: HCPCS | Performed by: PHYSICIAN ASSISTANT

## 2023-08-10 PROCEDURE — 3075F SYST BP GE 130 - 139MM HG: CPT | Performed by: PHYSICIAN ASSISTANT

## 2023-08-10 PROCEDURE — 83037 HB GLYCOSYLATED A1C HOME DEV: CPT | Performed by: PHYSICIAN ASSISTANT

## 2023-08-10 PROCEDURE — 3051F HG A1C>EQUAL 7.0%<8.0%: CPT | Performed by: PHYSICIAN ASSISTANT

## 2023-08-10 PROCEDURE — G8417 CALC BMI ABV UP PARAM F/U: HCPCS | Performed by: PHYSICIAN ASSISTANT

## 2023-08-10 PROCEDURE — 3078F DIAST BP <80 MM HG: CPT | Performed by: PHYSICIAN ASSISTANT

## 2023-08-10 RX ORDER — VALACYCLOVIR HYDROCHLORIDE 1 G/1
1000 TABLET, FILM COATED ORAL 2 TIMES DAILY
Qty: 14 TABLET | Refills: 5 | Status: SHIPPED | OUTPATIENT
Start: 2023-08-10 | End: 2023-08-17

## 2023-08-10 RX ORDER — METFORMIN HYDROCHLORIDE 500 MG/1
1000 TABLET, EXTENDED RELEASE ORAL
Qty: 180 TABLET | Refills: 0 | Status: SHIPPED | OUTPATIENT
Start: 2023-08-10

## 2023-08-10 RX ORDER — AMLODIPINE BESYLATE 10 MG/1
10 TABLET ORAL NIGHTLY
Qty: 90 TABLET | Refills: 1 | Status: SHIPPED | OUTPATIENT
Start: 2023-08-10

## 2023-08-10 SDOH — ECONOMIC STABILITY: FOOD INSECURITY: WITHIN THE PAST 12 MONTHS, THE FOOD YOU BOUGHT JUST DIDN'T LAST AND YOU DIDN'T HAVE MONEY TO GET MORE.: NEVER TRUE

## 2023-08-10 SDOH — ECONOMIC STABILITY: FOOD INSECURITY: WITHIN THE PAST 12 MONTHS, YOU WORRIED THAT YOUR FOOD WOULD RUN OUT BEFORE YOU GOT MONEY TO BUY MORE.: NEVER TRUE

## 2023-08-10 SDOH — ECONOMIC STABILITY: HOUSING INSECURITY
IN THE LAST 12 MONTHS, WAS THERE A TIME WHEN YOU DID NOT HAVE A STEADY PLACE TO SLEEP OR SLEPT IN A SHELTER (INCLUDING NOW)?: NO

## 2023-08-10 SDOH — ECONOMIC STABILITY: INCOME INSECURITY: HOW HARD IS IT FOR YOU TO PAY FOR THE VERY BASICS LIKE FOOD, HOUSING, MEDICAL CARE, AND HEATING?: NOT HARD AT ALL

## 2023-09-05 ENCOUNTER — TELEPHONE (OUTPATIENT)
Dept: FAMILY MEDICINE CLINIC | Age: 43
End: 2023-09-05

## 2023-09-05 NOTE — TELEPHONE ENCOUNTER
Pt was informed that he can take one tablet daily and can discuss additional medication on next visit. Before pt can be told that he is due for 3 months follow up appointment on 11/10/23 or given option to schedule appointment. Pt hanged up phone on me. LVM for pt to call back.

## 2023-09-05 NOTE — TELEPHONE ENCOUNTER
Pt was prescribed Metformin 500 mg Er tablet on 08/10/23. Pt was told to titrate it up from one tablet to two tablets daily. Pt started taking 2 tablets daily yesterday and toke 1 tablet this morning. Pt is experiencing nausea, vomiting, and sweating today. Pt was told to stop taking any metformin. Pt states he is fine taking one tablet daily, but taking two tablets caused side effects.

## 2023-10-13 RX ORDER — LISINOPRIL 40 MG/1
40 TABLET ORAL DAILY
Qty: 90 TABLET | Refills: 0 | OUTPATIENT
Start: 2023-10-13

## 2023-10-13 NOTE — TELEPHONE ENCOUNTER
7/2/2021 [facility]  No upcoming appt  6/24/2022 [facility]    Multiple attempts made to contact pt for an appt. Letter was mailed 7/5/23.

## 2023-10-18 ENCOUNTER — HOSPITAL ENCOUNTER (EMERGENCY)
Age: 43
Discharge: HOME | End: 2023-10-18
Attending: STUDENT IN AN ORGANIZED HEALTH CARE EDUCATION/TRAINING PROGRAM
Payer: COMMERCIAL

## 2023-10-18 ENCOUNTER — APPOINTMENT (OUTPATIENT)
Dept: CT IMAGING | Age: 43
End: 2023-10-18
Payer: COMMERCIAL

## 2023-10-18 VITALS
SYSTOLIC BLOOD PRESSURE: 158 MMHG | DIASTOLIC BLOOD PRESSURE: 88 MMHG | HEART RATE: 77 BPM | OXYGEN SATURATION: 98 % | TEMPERATURE: 98.4 F | RESPIRATION RATE: 16 BRPM

## 2023-10-18 DIAGNOSIS — R20.2 PARESTHESIAS: Primary | ICD-10-CM

## 2023-10-18 DIAGNOSIS — M54.12 CERVICAL RADICULOPATHY: ICD-10-CM

## 2023-10-18 LAB
ALBUMIN SERPL-MCNC: 3.9 G/DL (ref 3.4–5)
ALBUMIN/GLOB SERPL: 1.1 {RATIO} (ref 1.1–2.2)
ALP SERPL-CCNC: 75 U/L (ref 40–129)
ALT SERPL-CCNC: 41 U/L (ref 10–40)
ANION GAP SERPL CALCULATED.3IONS-SCNC: 10 MMOL/L (ref 3–16)
AST SERPL-CCNC: 25 U/L (ref 15–37)
BASOPHILS # BLD: 0 K/UL (ref 0–0.2)
BASOPHILS NFR BLD: 0.5 %
BILIRUB SERPL-MCNC: 0.4 MG/DL (ref 0–1)
BUN SERPL-MCNC: 20 MG/DL (ref 7–20)
CALCIUM SERPL-MCNC: 9.3 MG/DL (ref 8.3–10.6)
CHLORIDE SERPL-SCNC: 105 MMOL/L (ref 99–110)
CO2 SERPL-SCNC: 21 MMOL/L (ref 21–32)
CREAT SERPL-MCNC: 0.9 MG/DL (ref 0.9–1.3)
DEPRECATED RDW RBC AUTO: 14.5 % (ref 12.4–15.4)
EOSINOPHIL # BLD: 0.2 K/UL (ref 0–0.6)
EOSINOPHIL NFR BLD: 3.5 %
GFR SERPLBLD CREATININE-BSD FMLA CKD-EPI: >60 ML/MIN/{1.73_M2}
GLUCOSE SERPL-MCNC: 138 MG/DL (ref 70–99)
HCT VFR BLD AUTO: 44.1 % (ref 40.5–52.5)
HGB BLD-MCNC: 14.6 G/DL (ref 13.5–17.5)
LYMPHOCYTES # BLD: 1.7 K/UL (ref 1–5.1)
LYMPHOCYTES NFR BLD: 24.9 %
MCH RBC QN AUTO: 27.2 PG (ref 26–34)
MCHC RBC AUTO-ENTMCNC: 33.2 G/DL (ref 31–36)
MCV RBC AUTO: 82 FL (ref 80–100)
MONOCYTES # BLD: 0.5 K/UL (ref 0–1.3)
MONOCYTES NFR BLD: 7.9 %
NEUTROPHILS # BLD: 4.2 K/UL (ref 1.7–7.7)
NEUTROPHILS NFR BLD: 63.2 %
PLATELET # BLD AUTO: 238 K/UL (ref 135–450)
PMV BLD AUTO: 7.1 FL (ref 5–10.5)
POTASSIUM SERPL-SCNC: 4.4 MMOL/L (ref 3.5–5.1)
PROT SERPL-MCNC: 7.3 G/DL (ref 6.4–8.2)
RBC # BLD AUTO: 5.38 M/UL (ref 4.2–5.9)
SODIUM SERPL-SCNC: 136 MMOL/L (ref 136–145)
WBC # BLD AUTO: 6.7 K/UL (ref 4–11)

## 2023-10-18 PROCEDURE — 70450 CT HEAD/BRAIN W/O DYE: CPT

## 2023-10-18 PROCEDURE — 99284 EMERGENCY DEPT VISIT MOD MDM: CPT

## 2023-10-18 PROCEDURE — 80053 COMPREHEN METABOLIC PANEL: CPT

## 2023-10-18 PROCEDURE — 85025 COMPLETE CBC W/AUTO DIFF WBC: CPT

## 2023-10-18 RX ORDER — METHYLPREDNISOLONE 4 MG/1
TABLET ORAL
Qty: 1 KIT | Refills: 0 | Status: SHIPPED | OUTPATIENT
Start: 2023-10-18 | End: 2023-10-24

## 2023-10-19 NOTE — ED NOTES
Patient left via personal vehicle to private residence in stable condition. Patients vitals were assessed before discharge and were stable. Patient verbalized understanding of discharge instructions, follow up and medications. RN explained information in discharge packet and patient verbalized they have no questions at this time. Patient left ER with all paperwork and belongings that RN is aware of.         Osbaldo Roblero RN  10/18/23 2027

## 2023-10-19 NOTE — ED PROVIDER NOTES
[address] [place] [address]  ED     EMERGENCY DEPARTMENT ENCOUNTER            Pt Name: Vince Bridges   MRN: [id number]   [address] [facility] 1980   Date of evaluation: 10/18/2023   Provider: Zabrina Hinds MD   PCP: NAKUL Farris   Note Started: 8:43 PM EDT 10/18/23          CHIEF COMPLAINT     Chief Complaint   Patient presents with    Numbness     Left arm started last night after sleeping on it, around 0000        HISTORY OF PRESENT ILLNESS:   History from : Patient   Limitations to history : None     Vince Bridges is a 41 y.o. male who presents complaining of left hand paresthesias. Patient states that symptoms started yesterday evening. He states that he has a history of previous traumatic brain injury and tends to attribute any symptoms that he has to his previous TBI. He states that yesterday evening, at work, he started to develop some tingling in his left hand, primarily in his left thumb and first finger. He denies any focal weaknesses. Denies headaches or any other areas of numbness or tingling. He denies any other complaints or concerns. States that he does have a history of chronic back pain and degenerative disc disease. Nursing Notes were all reviewed and agreed with, or any disagreements were addressed in the HPI. REVIEW OF SYSTEMS :    Positives and Pertinent negatives as per HPI. MEDICAL HISTORY   has a past medical history of Chicken pox (03/1984), Closed fracture of arm (age 7), Concussion, Concussion, Coronary artery disease involving native coronary artery of native heart (07/02/2021), Decorative tattoo, Fall, GERD (gastroesophageal reflux disease), Hormone replacement therapy, Hypertension, Obese, XAVIER treated with BiPAP, Pericarditis, Pneumonia, Prolonged emergence from general anesthesia, Syphilis (acquired) (03/21/2023), TBI (traumatic brain injury) ([address]), and Umbilical hernia (22/32/4837).     Past Surgical History:   Procedure Laterality Date

## 2023-11-28 NOTE — H&P
Brief Pre-Op Note/Sedation Assessment      Estrellita Nettles  1980  Cath Pool Rm/NONE      [id number]  9:17 AM    Planned Procedure: Cardiac Catheterization Procedure    Post Procedure Plan: Return to same level of care    Consent: I have discussed with the patient and/or the patient representative the indication, alternatives, and the possible risks and/or complications of the planned procedure and the anesthesia methods. The patient and/or patient representative appear to understand and agree to proceed. Chief Complaint: Troponin elevation, abnormal nuclear SPECT stress test      Indications for Cath Procedure:  Troponin elevation, abnormal nuclear SPECT stress test  Anginal Classification within 2 weeks:  No symptoms  NYHA Heart Failure Class within 2 weeks: No symptoms  Is Cath Lab Visit Valve-related?: No  Surgical Risk: Low  Functional Type: >= 4 METS without symptoms    Anti- Anginal Meds within 2 weeks:   Yes: Ca Channel Blockers, Aspirin and Statin (Any)    Stress or Imaging Studies Performed (within 6 months):  Stress Test with SPECT Result: Positive:  inferior Risk/Extent of Ischemia:  Intermediate     Vital Signs:  Ht 6' 3\" (1.905 m)   Wt (!) 363 lb 3.2 oz (164.7 kg)   BMI 45.40 kg/m²     Allergies:   Allergies   Allergen Reactions    Prednisone Hives     itchy    Rocephin [Ceftriaxone] Itching       Past Medical History:  Past Medical History:   Diagnosis Date    Chicken pox 3/84    Closed fracture of arm age 13    Concussion     Hx of concussions    Concussion     Decorative tattoo     Fall     GERD (gastroesophageal reflux disease)     Hormone replacement therapy     Hypertension     Obese     XAVIER treated with BiPAP     Pericarditis     Pneumonia     Prolonged emergence from general anesthesia     agitation- hx of    TBI (traumatic brain injury) ([facility] [address]     hx of    Umbilical hernia 21/56/5140         Surgical History:  Past Surgical History:   Procedure Laterality Date    Your vitamin D is low, I recommend starting a daily vitamin D3 (cholecalciferol) 2,000 IU supplement. This can be obtained without a prescription at most pharmacies or grocery stores. We can recheck your vitamin D at a later time.      Call to schedule with [facility] psychiatry or psychology: [phone] or [phone]    Other psychiatrists:   Dr. Helen Hernandes [phone]  Dr. Rossy Foley [phone]  Dr. Shree Bhardwaj [phone]  Dr. Issa Duke [phone]  Dr. Irina Negrete - [phone]   Dr. Wyatt Box - [phone], [phone]   Dr. Cabrera Murillo - [phone]   Dr. Jennifer Hagen - [phone]   Dr. Nila Jerry - [phone]   Dr. Marcellus Mariscal - [phone]     [facility]ospit[facility] in [facility]: [phone]      BRONCHOSCOPY  02/13/2016    CHOLECYSTECTOMY  2007 (?)    HERNIA REPAIR  10/14/2016               Umbilical Hernia Repair    HERNIA REPAIR  60/21/1312    umbilical    LYMPH NODE BIOPSY Left 8/11/2020    BIOPSY OF left  INGUINAL LYMPH NODE with frozen section performed by Josh Gunter MD at [facility] 100 TYMPANOSTOMY TUBE PLACEMENT      bilaterally x 1    WISDOM TOOTH EXTRACTION      all 4         Medications:  Current Facility-Administered Medications   Medication Dose Route Frequency Provider Last Rate Last Admin    0.9 % sodium chloride infusion  1,000 mL Intravenous Continuous Eladio Zee DO               Pre-Sedation:    Pre-Sedation Documentation and Exam:  I have personally completed a history, physical exam & review of systems for this patient (see notes). Prior History of Anesthesia Complications:   none    Modified Mallampati:  III (soft palate, base of uvula visible)    ASA Classification:  Class 3 - A patient with severe systemic disease that limits activity but is not incapacitating      Arabella Scale: Activity:  2 - Able to move 4 extremities voluntarily on command  Respiration:  2 - Able to breathe deeply and cough freely  Circulation:  2 - BP+/- 20mmHg of normal  Consciousness:  2 - Fully awake  Oxygen Saturation (color):  2 - Able to maintain oxygen saturation >92% on room air    Sedation/Anesthesia Plan:  Guard the patient's safety and welfare. Minimize physical discomfort and pain. Minimize negative psychological responses to treatment by providing sedation and analgesia and maximize the potential amnesia. Patient to meet pre-procedure discharge plan.     Medication Planned:  midazolam intravenously and fentanyl intravenously    Patient is an appropriate candidate for plan of sedation: yes      Electronically signed by Brian Zee DO on 6/8/2021 at 9:17 AM

## 2023-12-20 ENCOUNTER — TELEPHONE (OUTPATIENT)
Dept: ADMINISTRATIVE | Age: 43
End: 2023-12-20

## 2023-12-20 NOTE — TELEPHONE ENCOUNTER
Submitted PA for Ozempic (0.25 or 0.5 MG/DOSE) 2MG/3ML pen-injectors  Via CMM Key: [id number] STATUS: PENDING.    Follow up done daily; if no decision with in three days we will refax.  If another three days goes by with no decision will call the insurance for status.

## 2023-12-21 NOTE — TELEPHONE ENCOUNTER
Pt was told PA for Ozempic was denied. Insurance wants pt to try Victoza or Trulicity. Pt was told last time that he can try Ozempic or Mounjaro by NAKUL Arceo. Pt was wondering any other options for weight loss?

## 2024-01-16 ENCOUNTER — HOSPITAL ENCOUNTER (OUTPATIENT)
Age: 44
Discharge: HOME | End: 2024-01-16
Payer: COMMERCIAL

## 2024-01-16 DIAGNOSIS — Z86.19 HISTORY OF SYPHILIS: ICD-10-CM

## 2024-01-16 DIAGNOSIS — E78.5 HYPERLIPIDEMIA, UNSPECIFIED HYPERLIPIDEMIA TYPE: ICD-10-CM

## 2024-01-16 PROCEDURE — 36415 COLL VENOUS BLD VENIPUNCTURE: CPT

## 2024-01-16 PROCEDURE — 80061 LIPID PANEL: CPT

## 2024-01-16 PROCEDURE — 0064U ANTB TP TOTAL&RPR IA QUAL: CPT

## 2024-01-17 LAB
CHOLEST SERPL-MCNC: 171 MG/DL (ref 0–199)
HDLC SERPL-MCNC: 34 MG/DL (ref 40–60)
LDLC SERPL CALC-MCNC: 109 MG/DL
REAGIN+T PALLIDUM IGG+IGM SERPL-IMP: REACTIVE
RPR SER QL: NORMAL
TRIGL SERPL-MCNC: 141 MG/DL (ref 0–150)
VLDLC SERPL CALC-MCNC: 28 MG/DL

## 2024-01-20 LAB — T PALLIDUM AB SER QL AGGL: REACTIVE

## 2024-01-23 ENCOUNTER — TELEPHONE (OUTPATIENT)
Dept: ADMINISTRATIVE | Age: 44
End: 2024-01-23

## 2024-01-23 NOTE — TELEPHONE ENCOUNTER
Submitted PA for Ozempic (0.25 or 0.5 MG/DOSE) 2MG/3ML pen-injectors  Via CMM Key: [id number] STATUS: PENDING.    Follow up done daily; if no decision with in three days we will refax.  If another three days goes by with no decision will call the insurance for status.

## 2024-01-24 NOTE — TELEPHONE ENCOUNTER
Per denial letter, pt needs to have a 120 day trial of 3 or more of the following: Byetta, Victoza, Trulicity, Farxiga, Invokana, or Jardiance.

## 2024-02-12 ENCOUNTER — APPOINTMENT (OUTPATIENT)
Dept: CT IMAGING | Age: 44
End: 2024-02-12
Payer: COMMERCIAL

## 2024-02-12 ENCOUNTER — HOSPITAL ENCOUNTER (EMERGENCY)
Age: 44
Discharge: HOME | End: 2024-02-12
Payer: COMMERCIAL

## 2024-02-12 VITALS
TEMPERATURE: 98.3 F | SYSTOLIC BLOOD PRESSURE: 160 MMHG | OXYGEN SATURATION: 95 % | HEART RATE: 100 BPM | DIASTOLIC BLOOD PRESSURE: 99 MMHG | RESPIRATION RATE: 16 BRPM

## 2024-02-12 DIAGNOSIS — L03.811 CELLULITIS OF HEAD EXCEPT FACE: Primary | ICD-10-CM

## 2024-02-12 LAB
ALBUMIN SERPL-MCNC: 4.3 G/DL (ref 3.4–5)
ALBUMIN/GLOB SERPL: 1.4 {RATIO} (ref 1.1–2.2)
ALP SERPL-CCNC: 79 U/L (ref 40–129)
ALT SERPL-CCNC: 38 U/L (ref 10–40)
ANION GAP SERPL CALCULATED.3IONS-SCNC: 12 MMOL/L (ref 3–16)
AST SERPL-CCNC: 26 U/L (ref 15–37)
BASOPHILS # BLD: 0.1 K/UL (ref 0–0.2)
BASOPHILS NFR BLD: 0.8 %
BILIRUB SERPL-MCNC: 0.3 MG/DL (ref 0–1)
BUN SERPL-MCNC: 14 MG/DL (ref 7–20)
CALCIUM SERPL-MCNC: 9.1 MG/DL (ref 8.3–10.6)
CHLORIDE SERPL-SCNC: 105 MMOL/L (ref 99–110)
CO2 SERPL-SCNC: 22 MMOL/L (ref 21–32)
CREAT SERPL-MCNC: 0.9 MG/DL (ref 0.9–1.3)
DEPRECATED RDW RBC AUTO: 13.2 % (ref 12.4–15.4)
EOSINOPHIL # BLD: 0.2 K/UL (ref 0–0.6)
EOSINOPHIL NFR BLD: 2.9 %
GFR SERPLBLD CREATININE-BSD FMLA CKD-EPI: >60 ML/MIN/{1.73_M2}
GLUCOSE SERPL-MCNC: 99 MG/DL (ref 70–99)
HCT VFR BLD AUTO: 45.4 % (ref 40.5–52.5)
HGB BLD-MCNC: 15.6 G/DL (ref 13.5–17.5)
LYMPHOCYTES # BLD: 2 K/UL (ref 1–5.1)
LYMPHOCYTES NFR BLD: 27.5 %
MCH RBC QN AUTO: 29.1 PG (ref 26–34)
MCHC RBC AUTO-ENTMCNC: 34.3 G/DL (ref 31–36)
MCV RBC AUTO: 84.8 FL (ref 80–100)
MONOCYTES # BLD: 0.6 K/UL (ref 0–1.3)
MONOCYTES NFR BLD: 8.6 %
NEUTROPHILS # BLD: 4.4 K/UL (ref 1.7–7.7)
NEUTROPHILS NFR BLD: 60.2 %
PLATELET # BLD AUTO: 252 K/UL (ref 135–450)
PMV BLD AUTO: 7.3 FL (ref 5–10.5)
POTASSIUM SERPL-SCNC: 4.3 MMOL/L (ref 3.5–5.1)
PROT SERPL-MCNC: 7.4 G/DL (ref 6.4–8.2)
RBC # BLD AUTO: 5.36 M/UL (ref 4.2–5.9)
SODIUM SERPL-SCNC: 139 MMOL/L (ref 136–145)
WBC # BLD AUTO: 7.3 K/UL (ref 4–11)

## 2024-02-12 PROCEDURE — 80053 COMPREHEN METABOLIC PANEL: CPT

## 2024-02-12 PROCEDURE — 70491 CT SOFT TISSUE NECK W/DYE: CPT

## 2024-02-12 PROCEDURE — 6370000000 HC RX 637 (ALT 250 FOR IP): Performed by: NURSE PRACTITIONER

## 2024-02-12 PROCEDURE — 85025 COMPLETE CBC W/AUTO DIFF WBC: CPT

## 2024-02-12 PROCEDURE — 6360000004 HC RX CONTRAST MEDICATION: Performed by: NURSE PRACTITIONER

## 2024-02-12 PROCEDURE — 99285 EMERGENCY DEPT VISIT HI MDM: CPT

## 2024-02-12 RX ORDER — DOXYCYCLINE HYCLATE 100 MG
100 TABLET ORAL 2 TIMES DAILY
Qty: 14 TABLET | Refills: 0 | Status: SHIPPED | OUTPATIENT
Start: 2024-02-12 | End: 2024-02-19

## 2024-02-12 RX ORDER — DOXYCYCLINE HYCLATE 100 MG
100 TABLET ORAL ONCE
Status: COMPLETED | OUTPATIENT
Start: 2024-02-12 | End: 2024-02-12

## 2024-02-12 RX ORDER — HYDROCODONE BITARTRATE AND ACETAMINOPHEN 5; 325 MG/1; MG/1
1 TABLET ORAL EVERY 6 HOURS PRN
Qty: 12 TABLET | Refills: 0 | Status: SHIPPED | OUTPATIENT
Start: 2024-02-12 | End: 2024-02-15

## 2024-02-12 RX ADMIN — DOXYCYCLINE HYCLATE 100 MG: 100 TABLET, COATED ORAL at 20:59

## 2024-02-12 RX ADMIN — IOPAMIDOL 75 ML: 755 INJECTION, SOLUTION INTRAVENOUS at 19:37

## 2024-02-15 NOTE — ED PROVIDER NOTES
[facility]  ED  EMERGENCY DEPARTMENT ENCOUNTER        Pt Name: Eugene Gonzalez  MRN: [id number]  Birthdate 1980  Date of evaluation: 2/12/2024  Provider: WINTER Duong - CNP  PCP: Bee Maldonado PA        BELINDA. I have evaluated this patient.        CHIEF COMPLAINT       Chief Complaint   Patient presents with    Abscess     Pt to ED for c/o an abscess on his head behind his hear.        HISTORY OF PRESENT ILLNESS: 1 or more Elements     History From: the patient  Limitations to history : None    Eugene Gonzalez is a 43 y.o. male who presents to the emerged from today with complaints of pain behind his right ear.  Patient has some swelling that he says is behind his right ear it is tender in the area of the mastoid, he is concerned with possible abscess, states that he was going to cut it open himself at some point.  Denies any fevers.  Denies trauma.  He is here for further evaluation.    Nursing Notes were all reviewed and agreed with or any disagreements were addressed in the HPI.    REVIEW OF SYSTEMS :      Review of Systems    Positives and Pertinent negatives as per HPI.     SURGICAL HISTORY     Past Surgical History:   Procedure Laterality Date    BRONCHOSCOPY  02/13/2016    CHOLECYSTECTOMY  2007 (?)    HERNIA REPAIR  10/14/2016               Umbilical Hernia Repair    HERNIA REPAIR  10/14/2016    umbilical    LYMPH NODE BIOPSY Left 8/11/2020    BIOPSY OF left  INGUINAL LYMPH NODE with frozen section performed by Abdi Medina MD at [facility] OR    TONSILLECTOMY AND ADENOIDECTOMY      TYMPANOSTOMY TUBE PLACEMENT      bilaterally x 1    WISDOM TOOTH EXTRACTION      all 4       CURRENTMEDICATIONS       Discharge Medication List as of 2/12/2024  9:00 PM        CONTINUE these medications which have NOT CHANGED    Details   Semaglutide,0.25 or 0.5MG/DOS, (OZEMPIC, 0.25 OR 0.5 MG/DOSE,) 2 MG/1.5ML SOPN Inject 0.25 mg into the skin once a week, Disp-3 mL, R-0Normal

## 2024-03-08 NOTE — TELEPHONE ENCOUNTER
Saida No [phone] (home)    is requesting refill(s) of medication Amlodipine to preferred pharmacy [facility] 1/22/21 (pertaining to medication)   Last refill  11/29/21 (per medication requested)  Next office visit scheduled or attempted No  Date   If No, reason Pt was due in September. We have several messages for pt to schedule. Left another message. Phone going straight to . Yes

## 2024-03-20 ENCOUNTER — OFFICE VISIT (OUTPATIENT)
Age: 44
End: 2024-03-20

## 2024-03-20 VITALS
SYSTOLIC BLOOD PRESSURE: 150 MMHG | OXYGEN SATURATION: 97 % | RESPIRATION RATE: 16 BRPM | TEMPERATURE: 97.9 F | WEIGHT: 315 LBS | DIASTOLIC BLOOD PRESSURE: 82 MMHG | HEART RATE: 85 BPM | BODY MASS INDEX: 39.17 KG/M2 | HEIGHT: 75 IN

## 2024-03-20 DIAGNOSIS — J01.90 ACUTE SINUSITIS, RECURRENCE NOT SPECIFIED, UNSPECIFIED LOCATION: ICD-10-CM

## 2024-03-20 DIAGNOSIS — J06.9 UPPER RESPIRATORY TRACT INFECTION, UNSPECIFIED TYPE: Primary | ICD-10-CM

## 2024-03-20 RX ORDER — AZELASTINE 1 MG/ML
1 SPRAY, METERED NASAL 2 TIMES DAILY
Qty: 60 ML | Refills: 0 | Status: SHIPPED | OUTPATIENT
Start: 2024-03-20

## 2024-03-20 RX ORDER — AZITHROMYCIN 250 MG/1
TABLET, FILM COATED ORAL
Qty: 6 TABLET | Refills: 0 | Status: SHIPPED | OUTPATIENT
Start: 2024-03-20 | End: 2024-03-30

## 2024-03-20 NOTE — TELEPHONE ENCOUNTER
Eugene DARIN Gonzalez [phone] (home)    is requesting refill(s) of medication Amlodipine 10 mg Tablet to preferred pharmacy [facility]    Last OV 12/18/23 (pertaining to medication)   Last refill 08/10/23 (per medication requested)  Next office visit scheduled or attempted Yes  Date 06/18/24        right normal/left normal

## 2024-03-20 NOTE — PROGRESS NOTES
oropharyngeal erythema present.      Tonsils: 0 on the right. 0 on the left.      Comments: Post nasal drip with congestion and erythema   Eyes:      Pupils: Pupils are equal, round, and reactive to light.   Cardiovascular:      Rate and Rhythm: Normal rate and regular rhythm.      Heart sounds: Normal heart sounds.   Pulmonary:      Effort: Pulmonary effort is normal.      Breath sounds: Normal breath sounds.   Musculoskeletal:      Cervical back: Normal range of motion and neck supple.   Skin:     General: Skin is warm and dry.   Neurological:      Mental Status: He is alert and oriented to person, place, and time.   Psychiatric:         Mood and Affect: Mood normal.         Behavior: Behavior normal.           An electronic signature was used to authenticate this note.    --King Kerr, APRN - CNP

## 2024-03-20 NOTE — PATIENT INSTRUCTIONS
Reviewed increasing water intake, sleeping in an elevated position to aid post nasal drip, using a cool mist humidifier,covering cough and proper hand hygiene.  Follow up with your pcp in 7 days if symptoms persist or if symptoms worsen.  New Prescriptions    AZELASTINE (ASTELIN) 0.1 % NASAL SPRAY    1 spray by Nasal route 2 times daily Use in each nostril as directed    AZITHROMYCIN (ZITHROMAX) 250 MG TABLET    500mg on day 1 followed by 250mg on days 2 - 5

## 2024-03-21 RX ORDER — AMLODIPINE BESYLATE 10 MG/1
10 TABLET ORAL NIGHTLY
Qty: 90 TABLET | Refills: 1 | Status: SHIPPED | OUTPATIENT
Start: 2024-03-21

## 2024-04-24 ENCOUNTER — OFFICE VISIT (OUTPATIENT)
Age: 44
End: 2024-04-24

## 2024-04-24 VITALS
DIASTOLIC BLOOD PRESSURE: 90 MMHG | SYSTOLIC BLOOD PRESSURE: 135 MMHG | OXYGEN SATURATION: 97 % | HEART RATE: 92 BPM | TEMPERATURE: 97.6 F

## 2024-04-24 DIAGNOSIS — K29.00 ACUTE GASTRITIS WITHOUT HEMORRHAGE, UNSPECIFIED GASTRITIS TYPE: ICD-10-CM

## 2024-04-24 DIAGNOSIS — R11.11 VOMITING WITHOUT NAUSEA, UNSPECIFIED VOMITING TYPE: Primary | ICD-10-CM

## 2024-04-24 LAB
INFLUENZA A ANTIGEN, POC: NORMAL
INFLUENZA B ANTIGEN, POC: NORMAL

## 2024-04-24 RX ORDER — ONDANSETRON 4 MG/1
4 TABLET, ORALLY DISINTEGRATING ORAL 3 TIMES DAILY PRN
Qty: 8 TABLET | Refills: 0 | Status: SHIPPED | OUTPATIENT
Start: 2024-04-24

## 2024-04-24 ASSESSMENT — ENCOUNTER SYMPTOMS
DIARRHEA: 0
COUGH: 0
SORE THROAT: 0
BACK PAIN: 0
NAUSEA: 1
BLOOD IN STOOL: 0
VOMITING: 1
ABDOMINAL PAIN: 0

## 2024-04-24 NOTE — PROGRESS NOTES
Eugene Gonzalez (:  1980) is a 43 y.o. male,Established patient, here for evaluation of the following chief complaint(s):  Emesis (Started  night ended Monday , fatigued  ) and Fever      ASSESSMENT/PLAN:    ICD-10-CM    1. Vomiting without nausea, unspecified vomiting type  R11.11 POCT Influenza A/B Antigen (BD Veritor)     ondansetron (ZOFRAN-ODT) 4 MG disintegrating tablet      2. Acute gastritis without hemorrhage, unspecified gastritis type  K29.00         Results for POC orders placed in visit on 24   POCT Influenza A/B Antigen (BD Veritor)   Result Value Ref Range    Inflenza A Ag Neg     Influenza B Ag Neg       Patient not interested in being tested for covid     Suspect Viral gastritis, still with some nausea but no further vomiting today-improving  Pancreatitis less likely , no history of PUD, has no GB     Keep hydrated, tylenol or ibuprofen (if no contraindications) as needed if pain or fever..  follow up in  5- days if not better  Return sooner or go to the ER if symptoms worse/feeling worse or has new symptoms or concerns   Hydration-increasing amount of volume over time discussed and then progression of diet discussed  Go to ER if : spikes fever, if return of vomiting or N/V continues despite medication (if prescribed)... if abdominal pain becomes worse-constant and focalized to one specific area of abdomen or back, or if notices black or bloody stools, or yellow eyes     Also mentioned taking an  over the counter pepcid AC if upset stomach    SUBJECTIVE/OBJECTIVE:  Patient presents with:  Emesis: Started  night ended Monday , fatigued    Fever at start of symptoms  No vomiting today..tolerating some fluids  No unusual foods or exposures     No recent alcohol..no diarrhea or abdominal pain..sore from vomiting  Has had GB removed      History provided by:  Patient   used: No    Emesis   Associated symptoms include a fever. Pertinent negatives include no

## 2024-04-24 NOTE — PATIENT INSTRUCTIONS
Keep hydrated, tylenol or ibuprofen (if no contraindications) as needed if pain or fever..  follow up in  5- days if not better  Return sooner or go to the ER if symptoms worse/feeling worse or has new symptoms or concerns   Hydration-increasing amount of volume over time discussed and then progression of diet discussed  Go to ER if : spikes fever, if return of vomiting or N/V continues despite medication (if prescribed)... if abdominal pain becomes worse-constant and focalized to one specific area of abdomen or back, or if notices black or bloody stools, or yellow eyes

## 2024-09-03 ENCOUNTER — HOSPITAL ENCOUNTER (EMERGENCY)
Age: 44
Discharge: HOME | End: 2024-09-03

## 2024-09-03 VITALS
RESPIRATION RATE: 16 BRPM | TEMPERATURE: 97.9 F | HEIGHT: 75 IN | DIASTOLIC BLOOD PRESSURE: 88 MMHG | BODY MASS INDEX: 39.17 KG/M2 | OXYGEN SATURATION: 97 % | WEIGHT: 315 LBS | SYSTOLIC BLOOD PRESSURE: 156 MMHG | HEART RATE: 82 BPM

## 2024-09-03 DIAGNOSIS — R21 RASH AND OTHER NONSPECIFIC SKIN ERUPTION: Primary | ICD-10-CM

## 2024-09-03 LAB
ALBUMIN SERPL-MCNC: 4.2 G/DL (ref 3.4–5)
ALBUMIN/GLOB SERPL: 1.4 {RATIO} (ref 1.1–2.2)
ALP SERPL-CCNC: 78 U/L (ref 40–129)
ALT SERPL-CCNC: 34 U/L (ref 10–40)
ANION GAP SERPL CALCULATED.3IONS-SCNC: 11 MMOL/L (ref 3–16)
APTT BLD: 29 SEC (ref 22.1–36.4)
AST SERPL-CCNC: 22 U/L (ref 15–37)
BASOPHILS # BLD: 0.1 K/UL (ref 0–0.2)
BASOPHILS NFR BLD: 0.9 %
BILIRUB SERPL-MCNC: 0.5 MG/DL (ref 0–1)
BUN SERPL-MCNC: 14 MG/DL (ref 7–20)
CALCIUM SERPL-MCNC: 9 MG/DL (ref 8.3–10.6)
CHLORIDE SERPL-SCNC: 103 MMOL/L (ref 99–110)
CO2 SERPL-SCNC: 22 MMOL/L (ref 21–32)
CREAT SERPL-MCNC: 0.9 MG/DL (ref 0.9–1.3)
CRP SERPL-MCNC: <3 MG/L (ref 0–5.1)
DEPRECATED RDW RBC AUTO: 13.8 % (ref 12.4–15.4)
EOSINOPHIL # BLD: 0.3 K/UL (ref 0–0.6)
EOSINOPHIL NFR BLD: 3.8 %
ERYTHROCYTE [SEDIMENTATION RATE] IN BLOOD BY WESTERGREN METHOD: 11 MM/HR (ref 0–15)
GFR SERPLBLD CREATININE-BSD FMLA CKD-EPI: >90 ML/MIN/{1.73_M2}
GLUCOSE SERPL-MCNC: 105 MG/DL (ref 70–99)
HCT VFR BLD AUTO: 43.2 % (ref 40.5–52.5)
HGB BLD-MCNC: 14.6 G/DL (ref 13.5–17.5)
INR PPP: 1.05 (ref 0.85–1.15)
LYMPHOCYTES # BLD: 1.9 K/UL (ref 1–5.1)
LYMPHOCYTES NFR BLD: 27.1 %
MCH RBC QN AUTO: 27.9 PG (ref 26–34)
MCHC RBC AUTO-ENTMCNC: 33.8 G/DL (ref 31–36)
MCV RBC AUTO: 82.6 FL (ref 80–100)
MONOCYTES # BLD: 0.5 K/UL (ref 0–1.3)
MONOCYTES NFR BLD: 7 %
NEUTROPHILS # BLD: 4.2 K/UL (ref 1.7–7.7)
NEUTROPHILS NFR BLD: 61.2 %
PLATELET # BLD AUTO: 259 K/UL (ref 135–450)
PMV BLD AUTO: 7.3 FL (ref 5–10.5)
POTASSIUM SERPL-SCNC: 4.6 MMOL/L (ref 3.5–5.1)
PROT SERPL-MCNC: 7.1 G/DL (ref 6.4–8.2)
PROTHROMBIN TIME: 13.9 SEC (ref 11.9–14.9)
RBC # BLD AUTO: 5.23 M/UL (ref 4.2–5.9)
SODIUM SERPL-SCNC: 136 MMOL/L (ref 136–145)
WBC # BLD AUTO: 6.9 K/UL (ref 4–11)

## 2024-09-03 PROCEDURE — 85652 RBC SED RATE AUTOMATED: CPT

## 2024-09-03 PROCEDURE — 99283 EMERGENCY DEPT VISIT LOW MDM: CPT

## 2024-09-03 PROCEDURE — 85025 COMPLETE CBC W/AUTO DIFF WBC: CPT

## 2024-09-03 PROCEDURE — 86140 C-REACTIVE PROTEIN: CPT

## 2024-09-03 PROCEDURE — 85610 PROTHROMBIN TIME: CPT

## 2024-09-03 PROCEDURE — 80053 COMPREHEN METABOLIC PANEL: CPT

## 2024-09-03 PROCEDURE — 85730 THROMBOPLASTIN TIME PARTIAL: CPT

## 2024-09-03 RX ORDER — METHYLPREDNISOLONE 4 MG
TABLET, DOSE PACK ORAL
Qty: 1 KIT | Refills: 0 | Status: SHIPPED | OUTPATIENT
Start: 2024-09-03 | End: 2024-09-09

## 2024-09-03 ASSESSMENT — PAIN - FUNCTIONAL ASSESSMENT: PAIN_FUNCTIONAL_ASSESSMENT: NONE - DENIES PAIN

## 2024-09-03 NOTE — ED PROVIDER NOTES
[facility] EMERGENCY DEPARTMENT  EMERGENCY DEPARTMENT ENCOUNTER        Pt Name: Eugene Gonzalez  MRN: [id number]  Birthdate 1980  Date of evaluation: 9/3/2024  Provider: Vitor Bueno PA-C  PCP: Bee Maldonado PA  Note Started: 12:59 PM EDT 9/3/24      BELINDA. I have evaluated this patient.        CHIEF COMPLAINT       Chief Complaint   Patient presents with    Rash     Rash that itches on bilateral lower legs x 2 weeks. Buttocks cheeks also itch per patient but no rash on buttocks.        HISTORY OF PRESENT ILLNESS: 1 or more Elements     History From: patient  Limitations to history : None    Eugene Gonzalez is a 44 y.o. male who presents to the emergency department with a chief complaint of a itchy rash on his bilateral lower legs.  Also has some intermittent itching on his bilateral buttocks but denies any itching there now or any rash.  States the rash is only been on his lower legs.  Initially was more purple in color and began about 2 weeks ago.  He denies any new lotions, foods, detergents.  He has history of hypertension and is on only 1 medication for this.  Denies chest pain, shortness of breath, vomiting, fevers, urinary or bowel symptoms, hematemesis, bloody stool or any other symptoms.  He states he has been having 20 pounds of unintentional weight loss over the past few months along with some fatigue.  He states he has multiple issues with intermittent lymphadenopathy and actually had lymph node biopsy performed about 3 years ago for possible lymphoma and states that this was negative.  Denies any history of malignancy.    Nursing Notes were all reviewed and agreed with or any disagreements were addressed in the HPI.    REVIEW OF SYSTEMS :      Review of Systems    Positives and Pertinent negatives as per HPI.     SURGICAL HISTORY     Past Surgical History:   Procedure Laterality Date    BRONCHOSCOPY  02/13/2016    CHOLECYSTECTOMY  2007 (?)    HERNIA REPAIR  10/14/2016

## 2024-11-26 RX ORDER — AMLODIPINE BESYLATE 10 MG/1
10 TABLET ORAL NIGHTLY
Qty: 30 TABLET | Refills: 0 | Status: SHIPPED | OUTPATIENT
Start: 2024-11-26

## 2024-11-26 NOTE — TELEPHONE ENCOUNTER
Eugene Gonzalez [phone] (home)    is requesting refill(s) of medication Amlodipine 10 mg Tablet to preferred pharmacy [facility]    Last OV 12/18/23 (pertaining to medication)   Last refill 03/21/24 (per medication requested)  Next office visit scheduled or attempted Yes  LVM for pt to schedule overdue 6 months follow up that was due on 06/18/24. Pt was advised to schedule appointment to get more refills through voicemail.

## 2024-12-02 ENCOUNTER — OFFICE VISIT (OUTPATIENT)
Dept: FAMILY MEDICINE CLINIC | Age: 44
End: 2024-12-02

## 2024-12-02 VITALS
RESPIRATION RATE: 16 BRPM | BODY MASS INDEX: 39.17 KG/M2 | HEART RATE: 88 BPM | OXYGEN SATURATION: 97 % | HEIGHT: 75 IN | SYSTOLIC BLOOD PRESSURE: 126 MMHG | WEIGHT: 315 LBS | DIASTOLIC BLOOD PRESSURE: 88 MMHG

## 2024-12-02 DIAGNOSIS — E11.65 TYPE 2 DIABETES MELLITUS WITH HYPERGLYCEMIA, WITHOUT LONG-TERM CURRENT USE OF INSULIN (HCC): Primary | ICD-10-CM

## 2024-12-02 DIAGNOSIS — I27.20 PULMONARY HTN (HCC): ICD-10-CM

## 2024-12-02 RX ORDER — AMLODIPINE BESYLATE 10 MG/1
10 TABLET ORAL NIGHTLY
Qty: 90 TABLET | Refills: 1 | Status: SHIPPED | OUTPATIENT
Start: 2024-12-02

## 2024-12-02 SDOH — ECONOMIC STABILITY: INCOME INSECURITY: HOW HARD IS IT FOR YOU TO PAY FOR THE VERY BASICS LIKE FOOD, HOUSING, MEDICAL CARE, AND HEATING?: NOT HARD AT ALL

## 2024-12-02 SDOH — ECONOMIC STABILITY: FOOD INSECURITY: WITHIN THE PAST 12 MONTHS, YOU WORRIED THAT YOUR FOOD WOULD RUN OUT BEFORE YOU GOT MONEY TO BUY MORE.: NEVER TRUE

## 2024-12-02 SDOH — ECONOMIC STABILITY: FOOD INSECURITY: WITHIN THE PAST 12 MONTHS, THE FOOD YOU BOUGHT JUST DIDN'T LAST AND YOU DIDN'T HAVE MONEY TO GET MORE.: NEVER TRUE

## 2024-12-02 ASSESSMENT — PATIENT HEALTH QUESTIONNAIRE - PHQ9
2. FEELING DOWN, DEPRESSED OR HOPELESS: NOT AT ALL
1. LITTLE INTEREST OR PLEASURE IN DOING THINGS: NOT AT ALL
1. LITTLE INTEREST OR PLEASURE IN DOING THINGS: NOT AT ALL
SUM OF ALL RESPONSES TO PHQ9 QUESTIONS 1 & 2: 0
SUM OF ALL RESPONSES TO PHQ QUESTIONS 1-9: 0
SUM OF ALL RESPONSES TO PHQ QUESTIONS 1-9: 0
2. FEELING DOWN, DEPRESSED OR HOPELESS: NOT AT ALL
SUM OF ALL RESPONSES TO PHQ QUESTIONS 1-9: 0
SUM OF ALL RESPONSES TO PHQ QUESTIONS 1-9: 0
SUM OF ALL RESPONSES TO PHQ9 QUESTIONS 1 & 2: 0

## 2024-12-02 NOTE — PROGRESS NOTES
Subjective:   Eugene Gonzalez is a 44 y.o. male with hypertension. He stopped metformin and has changed diet and is exercising more. He declines having A1c checked today. Does check blood sugars at home and is typically  range.   Current Outpatient Medications   Medication Sig Dispense Refill    amLODIPine (NORVASC) 10 MG tablet Take 1 tablet by mouth nightly PT WILL NEED APPOINTMENT FOR MORE REFILLS 30 tablet 0    lansoprazole (PREVACID) 30 MG delayed release capsule       Multiple Vitamin (MULTIVITAMIN PO) Take by mouth       No current facility-administered medications for this visit.      Hypertension ROS: taking medications as instructed, no medication side effects noted, no TIA's, no chest pain on exertion, no dyspnea on exertion, no swelling of ankles.       Objective:   /88   Pulse 88   Resp 16   Ht 1.905 m (6' 3\")   Wt (!) 175.5 kg (386 lb 12.8 oz)   SpO2 97%   BMI 48.35 kg/m²    Appearance alert, well appearing, and in no distress, oriented to person, place, and time, and overweight.  General exam BP noted to be well controlled today in office, S1, S2 normal, no gallop, no murmur, chest clear, no JVD, no HSM, no edema.       Assessment/Plan:     Diagnosis Orders   1. Type 2 diabetes mellitus with hyperglycemia, without long-term current use of insulin (HCC)  Declines A1c and stopped metformin. Working on diet and exercise. Will recheck in 6 months favian      2. Pulmonary HTN (HCC)  BP well controlled on norvasc 10mg daily                  Diagnosis Orders   1. Type 2 diabetes mellitus with hyperglycemia, without long-term current use of insulin (HCC)  POCT glycosylated hemoglobin (Hb A1C)      2. Pulmonary HTN (HCC)

## 2025-03-12 ENCOUNTER — OFFICE VISIT (OUTPATIENT)
Age: 45
End: 2025-03-12

## 2025-03-12 VITALS
OXYGEN SATURATION: 97 % | DIASTOLIC BLOOD PRESSURE: 70 MMHG | HEART RATE: 107 BPM | TEMPERATURE: 97.1 F | SYSTOLIC BLOOD PRESSURE: 142 MMHG

## 2025-03-12 DIAGNOSIS — R05.9 COUGH, UNSPECIFIED TYPE: Primary | ICD-10-CM

## 2025-03-12 DIAGNOSIS — J22 LOWER RESPIRATORY INFECTION: ICD-10-CM

## 2025-03-12 DIAGNOSIS — R09.81 NASAL CONGESTION: ICD-10-CM

## 2025-03-12 DIAGNOSIS — R09.89 CHEST CONGESTION: ICD-10-CM

## 2025-03-12 LAB
INFLUENZA A ANTIBODY: NEGATIVE
INFLUENZA B ANTIBODY: NEGATIVE
Lab: NORMAL
PERFORMING INSTRUMENT: NORMAL
QC PASS/FAIL: NORMAL
SARS-COV-2, POC: NORMAL

## 2025-03-12 RX ORDER — ALBUTEROL SULFATE 90 UG/1
2 INHALANT RESPIRATORY (INHALATION) 4 TIMES DAILY PRN
Qty: 18 G | Refills: 0 | Status: SHIPPED | OUTPATIENT
Start: 2025-03-12 | End: 2025-04-11

## 2025-03-12 RX ORDER — ALBUTEROL SULFATE 0.83 MG/ML
2.5 SOLUTION RESPIRATORY (INHALATION) 4 TIMES DAILY PRN
Qty: 120 EACH | Refills: 0 | Status: SHIPPED | OUTPATIENT
Start: 2025-03-12 | End: 2025-04-11

## 2025-03-12 RX ORDER — DEXTROMETHORPHAN HYDROBROMIDE AND PROMETHAZINE HYDROCHLORIDE 15; 6.25 MG/5ML; MG/5ML
5 SYRUP ORAL 4 TIMES DAILY PRN
Qty: 100 ML | Refills: 0 | Status: SHIPPED | OUTPATIENT
Start: 2025-03-12 | End: 2025-03-17

## 2025-03-12 NOTE — PROGRESS NOTES
Cervical back: Normal range of motion and neck supple.   Skin:     General: Skin is warm and dry.   Neurological:      Mental Status: He is alert and oriented to person, place, and time.       An electronic signature was used to authenticate this note.    --Evelio Ansari PA-C

## 2025-05-29 ENCOUNTER — PATIENT MESSAGE (OUTPATIENT)
Dept: FAMILY MEDICINE CLINIC | Age: 45
End: 2025-05-29

## 2025-05-30 RX ORDER — VALACYCLOVIR HYDROCHLORIDE 1 G/1
1000 TABLET, FILM COATED ORAL 2 TIMES DAILY
Qty: 14 TABLET | Refills: 0 | Status: SHIPPED | OUTPATIENT
Start: 2025-05-30 | End: 2025-06-06

## 2025-05-30 NOTE — TELEPHONE ENCOUNTER
Eugene Gonzalez [phone] (home) [phone] (work)   is requesting refill(s) of medication Valacyclovir 1 g Tablet to preferred pharmacy [facility]    Last OV 12/20/24 (pertaining to medication)   Last refill 08/10/23 (per medication requested)  Next office visit scheduled or attempted Yes  Date 06/23/25

## 2025-07-16 NOTE — TELEPHONE ENCOUNTER
Eugene Gonzalez is requesting refill(s) amlodipine  Last OV 12/2/24 (pertaining to medication)  LR 12/2/24 (per medication requested)  Next office visit scheduled or attempted Yes   If no, reason:  8/1/25

## 2025-07-17 RX ORDER — AMLODIPINE BESYLATE 10 MG/1
10 TABLET ORAL NIGHTLY
Qty: 90 TABLET | Refills: 1 | Status: SHIPPED | OUTPATIENT
Start: 2025-07-17

## 2025-07-23 ENCOUNTER — TELEPHONE (OUTPATIENT)
Dept: PULMONOLOGY | Age: 45
End: 2025-07-23

## 2025-07-23 DIAGNOSIS — G47.33 OSA (OBSTRUCTIVE SLEEP APNEA): ICD-10-CM

## 2025-07-23 DIAGNOSIS — G47.33 OSA ON CPAP: Primary | ICD-10-CM

## 2025-07-23 NOTE — TELEPHONE ENCOUNTER
Patient called in and stated his machine just broke and he has had this machine since about 2016 and this was a recall. He needs a new order placed and faxed to [facility]. 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 I spoke to Dr. Lee  and he said it was okay to palace order but pt needs to be seen in 4 weeks.    I called pt and had to leave  - he needs a follow up scheduled- 31-90 day visit

## 2025-09-04 ENCOUNTER — OFFICE VISIT (OUTPATIENT)
Dept: PULMONOLOGY | Age: 45
End: 2025-09-04
Payer: COMMERCIAL

## 2025-09-04 VITALS
DIASTOLIC BLOOD PRESSURE: 100 MMHG | RESPIRATION RATE: 17 BRPM | SYSTOLIC BLOOD PRESSURE: 150 MMHG | OXYGEN SATURATION: 95 % | HEART RATE: 88 BPM | HEIGHT: 75 IN | BODY MASS INDEX: 39.17 KG/M2 | WEIGHT: 315 LBS

## 2025-09-04 DIAGNOSIS — G47.10 HYPERSOMNIA: ICD-10-CM

## 2025-09-04 DIAGNOSIS — R06.81 APNEA: ICD-10-CM

## 2025-09-04 DIAGNOSIS — G47.33 OSA (OBSTRUCTIVE SLEEP APNEA): Primary | ICD-10-CM

## 2025-09-04 DIAGNOSIS — I10 UNCONTROLLED HYPERTENSION: ICD-10-CM

## 2025-09-04 PROCEDURE — 3077F SYST BP >= 140 MM HG: CPT | Performed by: INTERNAL MEDICINE

## 2025-09-04 PROCEDURE — 3080F DIAST BP >= 90 MM HG: CPT | Performed by: INTERNAL MEDICINE

## 2025-09-04 PROCEDURE — 99244 OFF/OP CNSLTJ NEW/EST MOD 40: CPT | Performed by: INTERNAL MEDICINE

## 2025-09-04 ASSESSMENT — SLEEP AND FATIGUE QUESTIONNAIRES
HOW LIKELY ARE YOU TO NOD OFF OR FALL ASLEEP WHILE SITTING AND READING: HIGH CHANCE OF DOZING
HOW LIKELY ARE YOU TO NOD OFF OR FALL ASLEEP WHILE SITTING QUIETLY AFTER LUNCH WITHOUT ALCOHOL: HIGH CHANCE OF DOZING
HOW LIKELY ARE YOU TO NOD OFF OR FALL ASLEEP WHILE WATCHING TV: HIGH CHANCE OF DOZING
HOW LIKELY ARE YOU TO NOD OFF OR FALL ASLEEP WHILE SITTING AND TALKING TO SOMEONE: WOULD NEVER DOZE
HOW LIKELY ARE YOU TO NOD OFF OR FALL ASLEEP IN A CAR, WHILE STOPPED FOR A FEW MINUTES IN TRAFFIC: WOULD NEVER DOZE
HOW LIKELY ARE YOU TO NOD OFF OR FALL ASLEEP WHEN YOU ARE A PASSENGER IN A CAR FOR AN HOUR WITHOUT A BREAK: WOULD NEVER DOZE
HOW LIKELY ARE YOU TO NOD OFF OR FALL ASLEEP WHILE SITTING INACTIVE IN A PUBLIC PLACE: MODERATE CHANCE OF DOZING
HOW LIKELY ARE YOU TO NOD OFF OR FALL ASLEEP WHILE LYING DOWN TO REST IN THE AFTERNOON WHEN CIRCUMSTANCES PERMIT: HIGH CHANCE OF DOZING
ESS TOTAL SCORE: 14

## (undated) DEVICE — SUTURE VCRL + SZ 3-0 L18IN ABSRB UD SH 1/2 CIR TAPERCUT NDL VCP864D

## (undated) DEVICE — SUTURE MCRYL + SZ 4-0 L18IN ABSRB UD L19MM PS-2 3/8 CIR MCP496G

## (undated) DEVICE — SURGICAL PROCEDURE PACK IV U-BAR

## (undated) DEVICE — Device

## (undated) DEVICE — ADHESIVE SKIN CLSR 0.7ML TOP DERMBND ADV

## (undated) DEVICE — SHEARS ENDOSCP L9CM CRV HARM FOCS +